# Patient Record
Sex: FEMALE | Race: WHITE | NOT HISPANIC OR LATINO | Employment: FULL TIME | URBAN - NONMETROPOLITAN AREA
[De-identification: names, ages, dates, MRNs, and addresses within clinical notes are randomized per-mention and may not be internally consistent; named-entity substitution may affect disease eponyms.]

---

## 2017-05-03 ENCOUNTER — OFFICE VISIT (OUTPATIENT)
Dept: CARDIOLOGY | Facility: CLINIC | Age: 57
End: 2017-05-03
Payer: COMMERCIAL

## 2017-05-03 VITALS
DIASTOLIC BLOOD PRESSURE: 70 MMHG | HEIGHT: 64 IN | HEART RATE: 70 BPM | SYSTOLIC BLOOD PRESSURE: 114 MMHG | WEIGHT: 169 LBS | BODY MASS INDEX: 28.85 KG/M2

## 2017-05-03 DIAGNOSIS — E78.2 MIXED HYPERLIPIDEMIA: ICD-10-CM

## 2017-05-03 DIAGNOSIS — I48.0 PAF (PAROXYSMAL ATRIAL FIBRILLATION) (HCC): ICD-10-CM

## 2017-05-03 PROCEDURE — 99213 OFFICE O/P EST LOW 20 MIN: CPT | Performed by: INTERNAL MEDICINE

## 2017-05-03 ASSESSMENT — ENCOUNTER SYMPTOMS: CARDIOVASCULAR NEGATIVE: 1

## 2017-05-03 NOTE — Clinical Note
Name:          Molly Jesus   YOB: 1960  Date:     5/3/2017      Asia Aquino, P.A.  [address]  [facility] [address]     Aroldo Garza MD  [address], [address]  [place] [address]  Phone: [phone]  Back Line: [phone]  Fax: [phone]  E-mail: [email or web address]   Dear Dr. Aquino,    We had the pleasure of seeing your patient, Molly Jesus, in Cardiology Clinic at [facility] and Vascular today.    As you know, she is a 56-year-old woman with a history of paroxysmal atrial fibrillation/flutter, hypothyroidism, GERD, and dyslipidemia.    She has no cardiovascular complaints today, and has not required any pill in pocket for a Pap and none since her last visit a year ago. I will plan to repeat her treadmill probably in one to 2 years from now. I discussed briefly her lipids with her, and I commended lifestyle modification over the nutritional supplement that she is taking.    We will have the patient follow-up in 1 year.    Thank you for the referral and please do not hesitate to contact me at any time. My contact information is listed above.    This note was dictated using Dragon speech recognition software.     A full note including my physical examination and a full list of rectified medications is available in our medical record, and can be faxed as well.    Aroldo Garza MD  Cardiologist  [facility]

## 2017-05-04 NOTE — PROGRESS NOTES
Subjective:   Molly Jesus is a 56 -year-old woman with a history of paroxysmal atrial fibrillation/flutter, hypothyroidism, GERD, and dyslipidemia.     She is doing well since her last visit. She has had no sustained episodes of palpitations consistent with atrial fibrillation. She has had some skipped beats consistent with PVCs. She has no other cardiovascular complaints today.    She discussed with me a vitamin supplement that she is taking she thinks for her cholesterol called Reliv.    Past Medical History   Diagnosis Date   • Ulcerative colitis    • Carpal tunnel syndrome    • Osteopenia    • Bipolar 1 disorder (CMS-HCC)    • Hypothyroid    • GERD (gastroesophageal reflux disease)    • Hiatal hernia    • HLD (hyperlipidemia) 6/17/2015   • HLD (hyperlipidemia) 6/17/2015     Past Surgical History   Procedure Laterality Date   • Hysterectomy, total abdominal     • Knee arthroscopy       No family history on file.  History   Smoking status   • Former Smoker   Smokeless tobacco   • Never Used     Allergies   Allergen Reactions   • Penicillins Hives   • Levaquin [Levofloxacin] Myalgia     Knee pain   • Morphine Vomiting     Severe vomiting     Outpatient Encounter Prescriptions as of 5/3/2017   Medication Sig Dispense Refill   • omeprazole (PRILOSEC) 40 MG delayed-release capsule Take 40 mg by mouth every day.     • mesalamine (ROWASA) 4 GM Enema Insert 1,000 mg in rectum every evening.     • propafenone (RYTHMOL) 300 MG tablet Take 1 Tab by mouth 1 time daily as needed (prolonged palpitations from atrial fibrillation). 5 Tab 6   • VITAMIN E PO Take  by mouth.     • montelukast (SINGULAIR) 10 MG Tab Take 10 mg by mouth every day.     • niacin  MG CPCR Take 1,000 mg by mouth every 12 hours.     • metoprolol SR (TOPROL XL) 50 MG TB24 Take 50 mg by mouth every day.     • aspirin (ASA) 81 MG CHEW chewable tablet Take 81 mg by mouth every day.     • levothyroxine (SYNTHROID) 25 MCG TABS Take 25 mcg by mouth  Every morning on an empty stomach.     • mesalamine (LIALDA) 1.2 GM TBEC Take 1.2 g by mouth every morning with breakfast.     • therapeutic multivitamin-minerals (THERAGRAN-M) TABS Take 1 Tab by mouth every day.     • Ascorbic Acid (VITAMIN C) 1000 MG TABS Take  by mouth.     • estradiol (ESTRACE) 0.1 MG/GM vaginal cream Insert  in vagina every 48 hours as needed.       No facility-administered encounter medications on file as of 5/3/2017.     Review of Systems   Cardiovascular: Negative.         Short runs of palpitations consistent with PVCs, no prolonged episodes consistent with atrial fibrillation   All other systems reviewed and are negative.       Objective:   There were no vitals taken for this visit.    Physical Exam   Constitutional: She is oriented to person, place, and time. She appears well-developed and well-nourished. No distress.   Anxious, middle-aged woman in NAD   HENT:   Head: Normocephalic and atraumatic.   Eyes: Conjunctivae and EOM are normal. Pupils are equal, round, and reactive to light. No scleral icterus.   Neck: Neck supple. No JVD present. No tracheal deviation present.   Cardiovascular: Normal rate, regular rhythm, normal heart sounds and intact distal pulses.  Exam reveals no gallop and no friction rub.    No murmur heard.  Pulses:       Dorsalis pedis pulses are 2+ on the right side, and 2+ on the left side.   No carotid bruits   Pulmonary/Chest: Effort normal and breath sounds normal. No stridor. No respiratory distress. She has no wheezes. She has no rales.   Abdominal: Soft. Bowel sounds are normal. She exhibits no distension.   Musculoskeletal: She exhibits no edema.   Neurological: She is alert and oriented to person, place, and time.   Skin: Skin is warm and dry. No rash noted. She is not diaphoretic. No erythema. No pallor.   Psychiatric: She has a normal mood and affect. Judgment and thought content normal.   Vitals reviewed.      Assessment:     1. PAF (paroxysmal atrial  fibrillation) (CMS-HCC)     2. Mixed hyperlipidemia         Medical Decision Making:  Today's Assessment / Status / Plan:     Medical Decision Making:    She has no cardiovascular complaints today, and has not required any pill in pocket for a Pap and none since her last visit a year ago. I will plan to repeat her treadmill probably in one to 2 years from now. I discussed briefly her lipids with her, and I commended lifestyle modification over the nutritional supplement that she is taking.    Aroldo Garza MD  Cardiologist, [facility] [place]

## 2017-05-04 NOTE — PROGRESS NOTES
Name:          Molly Jesus   YOB: 1960  Date:     5/3/2017      Asia Aquino, P.A.  [address]  [facility] [address]     Aroldo Garza MD  [address], [address]  [place] [address]  Phone: [phone]  Back Line: [phone]  Fax: [phone]  E-mail: [email or web address]   Dear Dr. Aquino,    We had the pleasure of seeing your patient, Molly Jesus, in Cardiology Clinic at [facility] and Vascular today.    As you know, she is a 56-year-old woman with a history of paroxysmal atrial fibrillation/flutter, hypothyroidism, GERD, and dyslipidemia.    She has no cardiovascular complaints today, and has not required any pill in pocket for a Pap and none since her last visit a year ago. I will plan to repeat her treadmill probably in one to 2 years from now. I discussed briefly her lipids with her, and I commended lifestyle modification over the nutritional supplement that she is taking.    We will have the patient follow-up in 1 year.    Thank you for the referral and please do not hesitate to contact me at any time. My contact information is listed above.    This note was dictated using Dragon speech recognition software.     A full note including my physical examination and a full list of rectified medications is available in our medical record, and can be faxed as well.    Aroldo Garza MD  Cardiologist  [facility]

## 2018-04-06 ENCOUNTER — OFFICE VISIT (OUTPATIENT)
Dept: URGENT CARE | Facility: PHYSICIAN GROUP | Age: 58
End: 2018-04-06
Payer: COMMERCIAL

## 2018-04-06 VITALS
BODY MASS INDEX: 29.02 KG/M2 | WEIGHT: 170 LBS | OXYGEN SATURATION: 96 % | HEIGHT: 64 IN | TEMPERATURE: 98.9 F | HEART RATE: 82 BPM | DIASTOLIC BLOOD PRESSURE: 74 MMHG | SYSTOLIC BLOOD PRESSURE: 122 MMHG | RESPIRATION RATE: 14 BRPM

## 2018-04-06 DIAGNOSIS — Z02.4 ENCOUNTER FOR CDL (COMMERCIAL DRIVING LICENSE) EXAM: ICD-10-CM

## 2018-04-06 PROCEDURE — 7100 PR DOT PHYSICAL: Performed by: PHYSICIAN ASSISTANT

## 2018-04-06 ASSESSMENT — PAIN SCALES - GENERAL: PAINLEVEL: NO PAIN

## 2018-04-06 NOTE — PROGRESS NOTES
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 She has a history of atrial fibrillation, she is not on anticoagulant.   Please see the chart for further information, however normal physical exam otherwise, cleared for 2yr cert without restrictions.

## 2018-04-13 ENCOUNTER — OFFICE VISIT (OUTPATIENT)
Dept: CARDIOLOGY | Facility: MEDICAL CENTER | Age: 58
End: 2018-04-13
Payer: COMMERCIAL

## 2018-04-13 VITALS
SYSTOLIC BLOOD PRESSURE: 92 MMHG | OXYGEN SATURATION: 97 % | HEIGHT: 64 IN | HEART RATE: 80 BPM | DIASTOLIC BLOOD PRESSURE: 60 MMHG | WEIGHT: 168 LBS | BODY MASS INDEX: 28.68 KG/M2

## 2018-04-13 DIAGNOSIS — E78.5 DYSLIPIDEMIA: ICD-10-CM

## 2018-04-13 DIAGNOSIS — I48.0 PAF (PAROXYSMAL ATRIAL FIBRILLATION) (HCC): ICD-10-CM

## 2018-04-13 PROCEDURE — 99213 OFFICE O/P EST LOW 20 MIN: CPT | Performed by: INTERNAL MEDICINE

## 2018-04-13 RX ORDER — PROPAFENONE HYDROCHLORIDE 300 MG/1
300 TABLET, COATED ORAL
Qty: 5 TAB | Refills: 6 | Status: SHIPPED | OUTPATIENT
Start: 2018-04-13 | End: 2019-04-19 | Stop reason: SDUPTHER

## 2018-04-13 ASSESSMENT — ENCOUNTER SYMPTOMS: PALPITATIONS: 1

## 2018-04-13 NOTE — PROGRESS NOTES
Subjective:   Molly Jesus is a 57 -year-old woman with a history of paroxysmal atrial fibrillation/flutter, hypothyroidism, GERD, and dyslipidemia.     She is doing well today, and has no cardiovascular complaints. She did recently stop niacin she believes though my last note documents that she had previously been on an alternative supplement called Reliv.    She does tell me but infrequent episodes of skipped beats consistent with PVCs, and one episode of sustained atrial fibrillation. She had forgotten her Rythmol, and her  had gone home to get the medication. The episode aborted spontaneously prior to taking the medicine, and as such she has not taken any doses of medicine since her last visit.    She comes in with her sister today, but the very pleasant.    Past Medical History:   Diagnosis Date   • Bipolar 1 disorder (CMS-[facility])    • Carpal tunnel syndrome    • GERD (gastroesophageal reflux disease)    • Hiatal hernia    • HLD (hyperlipidemia) 6/17/2015   • HLD (hyperlipidemia) 6/17/2015   • Hypothyroid    • Osteopenia    • Ulcerative colitis (CMS-[facility])      Past Surgical History:   Procedure Laterality Date   • HYSTERECTOMY, TOTAL ABDOMINAL     • KNEE ARTHROSCOPY       Family History   Problem Relation Age of Onset   • Heart Disease Sister      Mitral valve prolapse     History   Smoking Status   • Former Smoker   Smokeless Tobacco   • Never Used     Allergies   Allergen Reactions   • Penicillins Hives   • Levaquin [Levofloxacin] Myalgia     Knee pain   • Morphine Vomiting     Severe vomiting     Outpatient Encounter Prescriptions as of 4/13/2018   Medication Sig Dispense Refill   • Chlorpheniramine Maleate (ALLERGY PO) Take  by mouth.     • propafenone (RYTHMOL) 300 MG tablet Take 1 Tab by mouth 1 time daily as needed (prolonged palpitations from atrial fibrillation). 5 Tab 6   • omeprazole (PRILOSEC) 40 MG delayed-release capsule Take 40 mg by mouth every day.     • montelukast (SINGULAIR) 10 MG Tab  "Take 10 mg by mouth every day.     • metoprolol SR (TOPROL XL) 50 MG TB24 Take 50 mg by mouth every day.     • aspirin (ASA) 81 MG CHEW chewable tablet Take 81 mg by mouth every day.     • levothyroxine (SYNTHROID) 25 MCG TABS Take 25 mcg by mouth Every morning on an empty stomach.     • mesalamine (LIALDA) 1.2 GM TBEC Take 1.2 g by mouth every morning with breakfast.     • therapeutic multivitamin-minerals (THERAGRAN-M) TABS Take 1 Tab by mouth every day.     • Ascorbic Acid (VITAMIN C) 1000 MG TABS Take  by mouth.     • estradiol (ESTRACE) 0.1 MG/GM vaginal cream Insert  in vagina every 48 hours as needed.     • [DISCONTINUED] mesalamine (ROWASA) 4 GM Enema Insert 1,000 mg in rectum every evening.     • [DISCONTINUED] propafenone (RYTHMOL) 300 MG tablet Take 1 Tab by mouth 1 time daily as needed (prolonged palpitations from atrial fibrillation). 5 Tab 6   • [DISCONTINUED] VITAMIN E PO Take  by mouth.     • [DISCONTINUED] niacin  MG CPCR Take 1,000 mg by mouth every 12 hours.       No facility-administered encounter medications on file as of 4/13/2018.      Review of Systems   Cardiovascular: Positive for palpitations (infrequent palpitations consistent with PVCs, one episode of atrial fibrillation since her last visit).        Short runs of palpitations consistent with PVCs, no prolonged episodes consistent with atrial fibrillation   All other systems reviewed and are negative.       Objective:   BP (!) 92/60   Pulse 80   Ht 1.613 m (5' 3.5\")   Wt 76.2 kg (168 lb)   SpO2 97%   BMI 29.29 kg/m²     Physical Exam   Constitutional: She is oriented to person, place, and time. She appears well-developed and well-nourished. No distress.   Very pleasant, middle-age woman in no distress. She comes in with her sister today.   HENT:   Head: Normocephalic and atraumatic.   Eyes: Conjunctivae and EOM are normal. Pupils are equal, round, and reactive to light. No scleral icterus.   Neck: Neck supple. No JVD present. " No tracheal deviation present.   Cardiovascular: Normal rate, regular rhythm, normal heart sounds and intact distal pulses.  Exam reveals no gallop and no friction rub.    No murmur heard.  Pulses:       Dorsalis pedis pulses are 2+ on the right side, and 2+ on the left side.   No carotid bruits   Pulmonary/Chest: Effort normal and breath sounds normal. No stridor. No respiratory distress. She has no wheezes. She has no rales.   Abdominal: Soft. Bowel sounds are normal. She exhibits no distension.   Musculoskeletal: She exhibits edema (trace-1+ bilateral lower extremity edema very slightly greater on the left than right).   Neurological: She is alert and oriented to person, place, and time.   Skin: Skin is warm and dry. No rash noted. She is not diaphoretic. No erythema. No pallor.   Psychiatric: She has a normal mood and affect. Judgment and thought content normal.   Vitals reviewed.    Exercise treadmill test, 1/15/2016 (tracings reviewed): Patient did 5 minutes 59 seconds on the Kameron protocol achieving 89% of age-predicted maximum heart rate with no ischemic ECG changes    Labs, 12/15/2017  Chemistry panel: Sodium 143, potassium 4.3,   chloride 107, CO2 30, BUN 11, creatinine 0.8, glucose 102, calcium 8.7  LFTs: AST 22, ALT 39, alkaline phosphatase 116, albumin 3.7, total protein 7.0, total bilirubin 0.2  Lipids: , HDL 82, triglycerides 83, total cholesterol 216      Assessment:     1. PAF (paroxysmal atrial fibrillation) (CMS-[facility])  Echocardiogram Comp w/o Cont    propafenone (RYTHMOL) 300 MG tablet   2. Dyslipidemia  CT-CARDIAC SCORING       Medical Decision Making:  Today's Assessment / Status / Plan:     She is doing well today from a cardiovascular perspective with one episode of atrial fibrillation that aborted prior to taking her pill in the pocket Rythmol, which I refilled and she she does needed. I did order a repeat echocardiogram. I also reviewed with her her recent lipid panel, and ordered a CT  coronary calcium scan to evaluate whether we may need to do more to treat her dyslipidemia for example with a statin. I also reviewed her stroke risk of 1% given female gender and no other risk factors. I recommended initiation of an oral anticoagulant in the next couple of years despite her known gastrointestinal ulcers. I would probably start 15 mg by mouth daily of Xarelto (rivaroxaban) at that time. Otherwise, I have not changed her medical regimen at this time.    Aroldo Garza MD  Cardiologist, [facility] and [facility]     Return in about 1 year (around 4/13/2019).      Physical Exam   Constitutional: She is oriented to person, place, and time. She appears well-developed and well-nourished. No distress.   Very pleasant, middle-age woman in no distress. She comes in with her sister today.   HENT:   Head: Normocephalic and atraumatic.   Eyes: Conjunctivae and EOM are normal. Pupils are equal, round, and reactive to light. No scleral icterus.   Neck: Neck supple. No JVD present. No tracheal deviation present.   Cardiovascular: Normal rate, regular rhythm, normal heart sounds and intact distal pulses.  Exam reveals no gallop and no friction rub.    No murmur heard.  Pulses:       Dorsalis pedis pulses are 2+ on the right side, and 2+ on the left side.   No carotid bruits   Pulmonary/Chest: Effort normal and breath sounds normal. No stridor. No respiratory distress. She has no wheezes. She has no rales.   Abdominal: Soft. Bowel sounds are normal. She exhibits no distension.   Musculoskeletal: She exhibits edema (trace-1+ bilateral lower extremity edema very slightly greater on the left than right).   Neurological: She is alert and oriented to person, place, and time.   Skin: Skin is warm and dry. No rash noted. She is not diaphoretic. No erythema. No pallor.   Psychiatric: She has a normal mood and affect. Judgment and thought content normal.   Vitals reviewed.

## 2018-04-13 NOTE — LETTER
Name:          Molly Jesus   YOB: 1960  Date:     04/13/2018      Asia Aquino, P.A.  [address] [facility] [address]     Aroldo Garza MD  [address] [place] [address]  Phone: [phone]  Back Line: [phone]  Fax: [phone]  E-mail: [email or web address]   Dear Dr. Aquino,    We had the pleasure of seeing your patient, Molly Jesus, in Cardiology Clinic at [facility] and Vascular today.    As you know, she is a 57-year-old woman with a history of paroxysmal atrial fibrillation/flutter, GI ulcers, hypothyroidism, GERD, and dyslipidemia.    She is doing well today from a cardiovascular perspective with one episode of atrial fibrillation that aborted prior to taking her pill in the pocket Rythmol, which I refilled and she she does needed. I did order a repeat echocardiogram. I also reviewed with her her recent lipid panel, and ordered a CT coronary calcium scan to evaluate whether we may need to do more to treat her dyslipidemia for example with a statin. I also reviewed her stroke risk of 1% given female gender and no other risk factors. I recommended initiation of an oral anticoagulant in the next couple of years despite her known gastrointestinal ulcers. I would probably start 15 mg by mouth daily of Xarelto (rivaroxaban) at that time. Otherwise, I have not changed her medical regimen at this time.    Return in about 1 year (around 4/13/2019).    Thank you for the referral and please do not hesitate to contact me at any time. My contact information is listed above.    This note was dictated using Dragon speech recognition software.     A full note including my physical examination and a full list of rectified medications is available in our medical record, and can be faxed as well.    Aroldo Garza MD  Cardiologist  [facility]

## 2018-06-01 ENCOUNTER — HOSPITAL ENCOUNTER (OUTPATIENT)
Dept: RADIOLOGY | Facility: MEDICAL CENTER | Age: 58
End: 2018-06-01
Attending: INTERNAL MEDICINE
Payer: COMMERCIAL

## 2018-06-01 DIAGNOSIS — E78.5 DYSLIPIDEMIA: ICD-10-CM

## 2018-06-01 PROCEDURE — 4410556 CT-CARDIAC SCORING

## 2018-06-20 ENCOUNTER — TELEPHONE (OUTPATIENT)
Dept: CARDIOLOGY | Facility: MEDICAL CENTER | Age: 58
End: 2018-06-20

## 2018-06-20 NOTE — TELEPHONE ENCOUNTER
SANDHYA Sparrow/Elvia       Patient is returning your call from today. She can be reached at [phone].      ================================================================    Called pt back, no answer, detailed vm left regarding her CT cardiac scoring result

## 2018-06-20 NOTE — TELEPHONE ENCOUNTER
----- Message from Aroldo Garza M.D. sent at 6/20/2018  9:34 AM PDT -----  No cholesterol/calcium buildup in the heart arteries whatsoever.  Continue diet and exercise for cholesterol treatment.    IBA MD    ============================================================    Attempted to call pt, no answer, left vm to call back

## 2019-01-14 ENCOUNTER — APPOINTMENT (RX ONLY)
Dept: URBAN - METROPOLITAN AREA CLINIC 35 | Facility: CLINIC | Age: 59
Setting detail: DERMATOLOGY
End: 2019-01-14

## 2019-01-14 DIAGNOSIS — L82.1 OTHER SEBORRHEIC KERATOSIS: ICD-10-CM

## 2019-01-14 DIAGNOSIS — L91.8 OTHER HYPERTROPHIC DISORDERS OF THE SKIN: ICD-10-CM

## 2019-01-14 DIAGNOSIS — D22 MELANOCYTIC NEVI: ICD-10-CM

## 2019-01-14 DIAGNOSIS — Z85.828 PERSONAL HISTORY OF OTHER MALIGNANT NEOPLASM OF SKIN: ICD-10-CM

## 2019-01-14 DIAGNOSIS — Z71.89 OTHER SPECIFIED COUNSELING: ICD-10-CM

## 2019-01-14 DIAGNOSIS — L81.4 OTHER MELANIN HYPERPIGMENTATION: ICD-10-CM

## 2019-01-14 PROBLEM — J30.1 ALLERGIC RHINITIS DUE TO POLLEN: Status: ACTIVE | Noted: 2019-01-14

## 2019-01-14 PROBLEM — D22.5 MELANOCYTIC NEVI OF TRUNK: Status: ACTIVE | Noted: 2019-01-14

## 2019-01-14 PROCEDURE — ? OBSERVATION AND MEASURE

## 2019-01-14 PROCEDURE — ? COUNSELING

## 2019-01-14 PROCEDURE — 99213 OFFICE O/P EST LOW 20 MIN: CPT

## 2019-01-14 ASSESSMENT — LOCATION DETAILED DESCRIPTION DERM
LOCATION DETAILED: RIGHT LATERAL SUPERIOR CHEST
LOCATION DETAILED: RIGHT PROXIMAL DORSAL FOREARM
LOCATION DETAILED: EPIGASTRIC SKIN
LOCATION DETAILED: RIGHT RIB CAGE
LOCATION DETAILED: MIDDLE STERNUM
LOCATION DETAILED: PERIUMBILICAL SKIN
LOCATION DETAILED: RIGHT LATERAL ABDOMEN
LOCATION DETAILED: RIGHT INFERIOR MEDIAL UPPER BACK
LOCATION DETAILED: RIGHT CLAVICULAR NECK
LOCATION DETAILED: RIGHT INFERIOR LATERAL NECK
LOCATION DETAILED: RIGHT INFERIOR ANTERIOR NECK
LOCATION DETAILED: LEFT MEDIAL MALAR CHEEK
LOCATION DETAILED: RIGHT SUPRAPUBIC SKIN
LOCATION DETAILED: LEFT INFERIOR LATERAL NECK

## 2019-01-14 ASSESSMENT — LOCATION SIMPLE DESCRIPTION DERM
LOCATION SIMPLE: CHEST
LOCATION SIMPLE: RIGHT UPPER BACK
LOCATION SIMPLE: GROIN
LOCATION SIMPLE: LEFT CHEEK
LOCATION SIMPLE: RIGHT ANTERIOR NECK
LOCATION SIMPLE: RIGHT FOREARM
LOCATION SIMPLE: ABDOMEN
LOCATION SIMPLE: LEFT ANTERIOR NECK

## 2019-01-14 ASSESSMENT — LOCATION ZONE DERM
LOCATION ZONE: FACE
LOCATION ZONE: TRUNK
LOCATION ZONE: ARM
LOCATION ZONE: NECK

## 2019-04-19 ENCOUNTER — OFFICE VISIT (OUTPATIENT)
Dept: CARDIOLOGY | Facility: MEDICAL CENTER | Age: 59
End: 2019-04-19
Payer: COMMERCIAL

## 2019-04-19 VITALS
BODY MASS INDEX: 28.17 KG/M2 | HEART RATE: 76 BPM | OXYGEN SATURATION: 98 % | DIASTOLIC BLOOD PRESSURE: 70 MMHG | HEIGHT: 64 IN | WEIGHT: 165 LBS | SYSTOLIC BLOOD PRESSURE: 104 MMHG

## 2019-04-19 DIAGNOSIS — E78.2 MIXED HYPERLIPIDEMIA: ICD-10-CM

## 2019-04-19 DIAGNOSIS — R93.1 AGATSTON CAC SCORE, <100: ICD-10-CM

## 2019-04-19 DIAGNOSIS — I48.0 PAF (PAROXYSMAL ATRIAL FIBRILLATION) (HCC): Primary | ICD-10-CM

## 2019-04-19 PROCEDURE — 99214 OFFICE O/P EST MOD 30 MIN: CPT | Performed by: INTERNAL MEDICINE

## 2019-04-19 RX ORDER — PROPAFENONE HYDROCHLORIDE 300 MG/1
300 TABLET, COATED ORAL
Qty: 5 TAB | Refills: 6 | Status: SHIPPED | OUTPATIENT
Start: 2019-04-19 | End: 2021-12-22

## 2019-04-19 RX ORDER — PAROXETINE HYDROCHLORIDE 20 MG/1
20 TABLET, FILM COATED ORAL DAILY
COMMUNITY
Start: 2019-04-13

## 2019-04-19 ASSESSMENT — ENCOUNTER SYMPTOMS: PALPITATIONS: 1

## 2019-04-19 NOTE — PROGRESS NOTES
Subjective:   Molly Jesus is a 58 -year-old woman with a history of paroxysmal atrial fibrillation/flutter, GI ulcers, hypothyroidism, GERD, and dyslipidemia.     He is doing quite well today, and has no cardiovascular complaints.  She tells me about one episode of palpitations consistent with atrial fibrillation in October or November 2018.  She was at work, and asked her  to bring her the prescription for Rythmol.  The whole episode lasted for about 30 minutes, and resolved spontaneously before she took that medication.    She has no other cardiovascular complaints.    She continues on aspirin without any side effects.  We reviewed the results of her coronary calcium scan as below.    Past Medical History:   Diagnosis Date   • Bipolar 1 disorder (HCC)    • Carpal tunnel syndrome    • GERD (gastroesophageal reflux disease)    • Hiatal hernia    • HLD (hyperlipidemia) 6/17/2015   • HLD (hyperlipidemia) 6/17/2015   • Hypothyroid    • Osteopenia    • Ulcerative colitis (HCC)      Past Surgical History:   Procedure Laterality Date   • HYSTERECTOMY, TOTAL ABDOMINAL     • KNEE ARTHROSCOPY       Family History   Problem Relation Age of Onset   • Heart Disease Sister         Mitral valve prolapse     History   Smoking Status   • Former Smoker   Smokeless Tobacco   • Never Used     Allergies   Allergen Reactions   • Penicillins Hives   • Levaquin [Levofloxacin] Myalgia     Knee pain   • Morphine Vomiting     Severe vomiting     Outpatient Encounter Prescriptions as of 4/19/2019   Medication Sig Dispense Refill   • PARoxetine (PAXIL) 20 MG Tab      • propafenone (RYTHMOL) 300 MG tablet Take 1 Tab by mouth 1 time daily as needed (prolonged palpitations from atrial fibrillation). 5 Tab 6   • Chlorpheniramine Maleate (ALLERGY PO) Take  by mouth.     • omeprazole (PRILOSEC) 40 MG delayed-release capsule Take 40 mg by mouth every day.     • montelukast (SINGULAIR) 10 MG Tab Take 10 mg by mouth every day.     •  "metoprolol SR (TOPROL XL) 50 MG TB24 Take 50 mg by mouth every day.     • aspirin (ASA) 81 MG CHEW chewable tablet Take 81 mg by mouth every day.     • levothyroxine (SYNTHROID) 25 MCG TABS Take 25 mcg by mouth Every morning on an empty stomach.     • mesalamine (LIALDA) 1.2 GM TBEC Take 1.2 g by mouth every morning with breakfast.     • therapeutic multivitamin-minerals (THERAGRAN-M) TABS Take 1 Tab by mouth every day.     • estradiol (ESTRACE) 0.1 MG/GM vaginal cream Insert  in vagina every 48 hours as needed.     • [DISCONTINUED] propafenone (RYTHMOL) 300 MG tablet Take 1 Tab by mouth 1 time daily as needed (prolonged palpitations from atrial fibrillation). (Patient not taking: Reported on 4/19/2019) 5 Tab 6   • [DISCONTINUED] Ascorbic Acid (VITAMIN C) 1000 MG TABS Take  by mouth.       No facility-administered encounter medications on file as of 4/19/2019.      Review of Systems   Cardiovascular: Positive for palpitations (One significant episode of palpitations consistent with atrial fibrillation in the last year).        Short runs of palpitations consistent with PVCs, no prolonged episodes consistent with atrial fibrillation   All other systems reviewed and are negative.       Objective:   /70 (BP Location: Left arm, Patient Position: Sitting, BP Cuff Size: Adult)   Pulse 76   Ht 1.613 m (5' 3.5\")   Wt 74.8 kg (165 lb)   SpO2 98%   BMI 28.77 kg/m²     Physical Exam   Constitutional: She is oriented to person, place, and time. She appears well-developed and well-nourished. No distress.   Very pleasant, middle-age woman in no distress. She comes in with her  today.  Physical examination is unchanged except where specified compared to my previous on 4/13/2018.   HENT:   Head: Normocephalic and atraumatic.   Eyes: Pupils are equal, round, and reactive to light. Conjunctivae and EOM are normal. No scleral icterus.   Neck: Neck supple. No JVD present. No tracheal deviation present. " "  Cardiovascular: Normal rate, regular rhythm, normal heart sounds and intact distal pulses.  Exam reveals no gallop and no friction rub.    No murmur heard.  Pulses:       Dorsalis pedis pulses are 2+ on the right side, and 2+ on the left side.   No carotid bruits   Pulmonary/Chest: Effort normal and breath sounds normal. No stridor. No respiratory distress. She has no wheezes. She has no rales.   Abdominal: Soft. Bowel sounds are normal. She exhibits no distension.   Musculoskeletal: She exhibits no edema (Trace bilateral lower extremity edema).   Neurological: She is alert and oriented to person, place, and time.   Skin: Skin is warm and dry. No rash noted. She is not diaphoretic. No erythema. No pallor.   Psychiatric: She has a normal mood and affect. Judgment and thought content normal.   Vitals reviewed.    Exercise treadmill test, 1/15/2016 (tracings reviewed): Patient did 5 minutes 59 seconds on the Kameron protocol achieving 89% of age-predicted maximum heart rate with no ischemic ECG changes    Labs, 12/15/2017  Chemistry panel: Sodium 143, potassium 4.3,   chloride 107, CO2 30, BUN 11, creatinine 0.8, glucose 102, calcium 8.7  LFTs: AST 22, ALT 39, alkaline phosphatase 116, albumin 3.7, total protein 7.0, total bilirubin 0.2  Lipids: , HDL 82, triglycerides 83, total cholesterol 216    Coronary calcium scan, 6/1/2018: Total score of 0.0    Echocardiogram, 8/5/2015:  \"CONCLUSIONS  Normal left ventricular size, thickness, systolic function, and   diastolic function.  Mild mitral regurgitation.  Trace aortic insufficiency.  Mild tricuspid regurgitation.  Trace pulmonic insufficiency\"      Assessment:     1. PAF (paroxysmal atrial fibrillation) ([facility])  EC-ECHOCARDIOGRAM COMPLETE W/O CONT    propafenone (RYTHMOL) 300 MG tablet   2. Mixed hyperlipidemia     3. Agatston CAC score 0.0 on 6/2018         Medical Decision Making:  Today's Assessment / Status / Plan:     She is doing quite well today, and has had " one episode of atrial fibrillation in the last year that resolved spontaneously before she could take her antiarrhythmic, Rythmol.  She continues on aspirin, and has no other cardiovascular complaints.  I reviewed with her the results of her echocardiogram from 2015 and reminded her that I did ask her to get an echocardiogram last year reordering that again.  She has absolutely no coronary calcification (total score of 0.0) on her calcium score again last summer.  I refilled her prescription for Rythmol, and made no changes to her medical regimen otherwise.  I did emphasize with her that at the age of 64 she will have a 2% annual risk of stroke and would graduate at that point to an oral anticoagulant.    Aroldo Garza MD  Cardiologist, [facility] [place]     Return in about 1 year (around 4/19/2020).      Physical Exam   Constitutional: She is oriented to person, place, and time. She appears well-developed and well-nourished. No distress.   Very pleasant, middle-age woman in no distress. She comes in with her  today.  Physical examination is unchanged except where specified compared to my previous on 4/13/2018.   HENT:   Head: Normocephalic and atraumatic.   Eyes: Pupils are equal, round, and reactive to light. Conjunctivae and EOM are normal. No scleral icterus.   Neck: Neck supple. No JVD present. No tracheal deviation present.   Cardiovascular: Normal rate, regular rhythm, normal heart sounds and intact distal pulses.  Exam reveals no gallop and no friction rub.    No murmur heard.  Pulses:       Dorsalis pedis pulses are 2+ on the right side, and 2+ on the left side.   No carotid bruits   Pulmonary/Chest: Effort normal and breath sounds normal. No stridor. No respiratory distress. She has no wheezes. She has no rales.   Abdominal: Soft. Bowel sounds are normal. She exhibits no distension.   Musculoskeletal: She exhibits no edema (Trace bilateral lower extremity edema).   Neurological:  She is alert and oriented to person, place, and time.   Skin: Skin is warm and dry. No rash noted. She is not diaphoretic. No erythema. No pallor.   Psychiatric: She has a normal mood and affect. Judgment and thought content normal.   Vitals reviewed.

## 2019-04-19 NOTE — LETTER
Name:          Molly Jesus   YOB: 1960  Date:     04/19/2019      Asia Aquino, P.A.  [address] [facility] [address]     Aroldo Garza MD  [address] [place] [id number]  Phone: [phone]  Back Line: [phone]  Fax: [phone]  E-mail: [email or web address]   Dear Dr. Aquino,    We had the pleasure of seeing your patient, Molly Jesus, in Cardiology Clinic at [facility] and [facility] today.    As you know, she is a 58-year-old woman with a history of paroxysmal atrial fibrillation/flutter, GI ulcers, hypothyroidism, GERD, and dyslipidemia.    She is doing quite well today, and has had one episode of atrial fibrillation in the last year that resolved spontaneously before she could take her antiarrhythmic, Rythmol.  She continues on aspirin, and has no other cardiovascular complaints.  I reviewed with her the results of her echocardiogram from 2015 and reminded her that I did ask her to get an echocardiogram last year reordering that again.  She has absolutely no coronary calcification (total score of 0.0) on her calcium score again last summer.  I refilled her prescription for Rythmol, and made no changes to her medical regimen otherwise.  I did emphasize with her that at the age of 64 she will have a 2% annual risk of stroke and would graduate at that point to an oral anticoagulant.    Return in about 1 year (around 4/19/2020).    Thank you for the referral and please do not hesitate to contact me at any time. My contact information is listed above.    This note was dictated using Dragon speech recognition software.     A full note including my physical examination and a full list of rectified medications is available in our medical record, and can be faxed as well.    Aroldo Garza MD  Cardiologist  [facility]

## 2019-07-16 ENCOUNTER — HOSPITAL ENCOUNTER (OUTPATIENT)
Dept: CARDIOLOGY | Facility: MEDICAL CENTER | Age: 59
End: 2019-07-16
Attending: INTERNAL MEDICINE
Payer: COMMERCIAL

## 2019-07-16 DIAGNOSIS — I48.0 PAF (PAROXYSMAL ATRIAL FIBRILLATION) (HCC): ICD-10-CM

## 2019-07-16 LAB
LV EJECT FRACT  99904: 60
LV EJECT FRACT MOD 2C 99903: 52.57
LV EJECT FRACT MOD 4C 99902: 64.15
LV EJECT FRACT MOD BP 99901: 56.33

## 2019-07-16 PROCEDURE — 93306 TTE W/DOPPLER COMPLETE: CPT

## 2019-07-16 PROCEDURE — 93306 TTE W/DOPPLER COMPLETE: CPT | Mod: 26 | Performed by: INTERNAL MEDICINE

## 2019-08-12 ENCOUNTER — APPOINTMENT (RX ONLY)
Dept: URBAN - METROPOLITAN AREA CLINIC 35 | Facility: CLINIC | Age: 59
Setting detail: DERMATOLOGY
End: 2019-08-12

## 2019-08-12 DIAGNOSIS — L60.9 NAIL DISORDER, UNSPECIFIED: ICD-10-CM

## 2019-08-12 DIAGNOSIS — Z85.828 PERSONAL HISTORY OF OTHER MALIGNANT NEOPLASM OF SKIN: ICD-10-CM

## 2019-08-12 DIAGNOSIS — L73.8 OTHER SPECIFIED FOLLICULAR DISORDERS: ICD-10-CM

## 2019-08-12 DIAGNOSIS — Z71.89 OTHER SPECIFIED COUNSELING: ICD-10-CM

## 2019-08-12 DIAGNOSIS — D22 MELANOCYTIC NEVI: ICD-10-CM

## 2019-08-12 DIAGNOSIS — L81.4 OTHER MELANIN HYPERPIGMENTATION: ICD-10-CM

## 2019-08-12 DIAGNOSIS — L72.0 EPIDERMAL CYST: ICD-10-CM

## 2019-08-12 DIAGNOSIS — L82.1 OTHER SEBORRHEIC KERATOSIS: ICD-10-CM

## 2019-08-12 PROBLEM — D22.5 MELANOCYTIC NEVI OF TRUNK: Status: ACTIVE | Noted: 2019-08-12

## 2019-08-12 PROCEDURE — ? DEFER

## 2019-08-12 PROCEDURE — 99214 OFFICE O/P EST MOD 30 MIN: CPT

## 2019-08-12 PROCEDURE — ? COUNSELING

## 2019-08-12 PROCEDURE — ? OBSERVATION AND MEASURE

## 2019-08-12 ASSESSMENT — LOCATION ZONE DERM
LOCATION ZONE: FACE
LOCATION ZONE: NECK
LOCATION ZONE: FINGERNAIL
LOCATION ZONE: NOSE
LOCATION ZONE: TRUNK
LOCATION ZONE: ARM

## 2019-08-12 ASSESSMENT — LOCATION DETAILED DESCRIPTION DERM
LOCATION DETAILED: RIGHT SUPERIOR NASAL CHEEK
LOCATION DETAILED: MIDDLE STERNUM
LOCATION DETAILED: EPIGASTRIC SKIN
LOCATION DETAILED: RIGHT PROXIMAL DORSAL FOREARM
LOCATION DETAILED: RIGHT CLAVICULAR NECK
LOCATION DETAILED: NASAL DORSUM
LOCATION DETAILED: NASAL SUPRATIP
LOCATION DETAILED: RIGHT RIB CAGE
LOCATION DETAILED: RIGHT INFERIOR MEDIAL UPPER BACK
LOCATION DETAILED: RIGHT LATERAL ABDOMEN
LOCATION DETAILED: RIGHT INDEX FINGERNAIL
LOCATION DETAILED: LEFT CENTRAL BUCCAL CHEEK
LOCATION DETAILED: RIGHT LATERAL SUPERIOR CHEST
LOCATION DETAILED: RIGHT SUPRAPUBIC SKIN
LOCATION DETAILED: LEFT MEDIAL MALAR CHEEK

## 2019-08-12 ASSESSMENT — LOCATION SIMPLE DESCRIPTION DERM
LOCATION SIMPLE: ABDOMEN
LOCATION SIMPLE: RIGHT INDEX FINGERNAIL
LOCATION SIMPLE: NOSE
LOCATION SIMPLE: RIGHT FOREARM
LOCATION SIMPLE: RIGHT CHEEK
LOCATION SIMPLE: RIGHT UPPER BACK
LOCATION SIMPLE: RIGHT ANTERIOR NECK
LOCATION SIMPLE: LEFT CHEEK
LOCATION SIMPLE: GROIN
LOCATION SIMPLE: CHEST

## 2019-08-12 NOTE — PROCEDURE: DEFER
Detail Level: Detailed
Procedure To Be Performed At Next Visit: Curettage
Introduction Text (Please End With A Colon): :

## 2019-08-12 NOTE — MR AVS SNAPSHOT
"        Molly Jesus   5/3/2017 3:40 PM   Office Visit   MRN: [id number]    Department:  Heart [facility]   Dept Phone:  [phone]    Description:  Female : 1960   Provider:  Aroldo Garza M.D.           Reason for Visit     Follow-Up           Allergies as of 5/3/2017     Allergen Noted Reactions    Penicillins 2015   Hives    Levaquin [Levofloxacin] 2015   Myalgia    Knee pain    Morphine 2015   Vomiting    Severe vomiting      You were diagnosed with     PAF (paroxysmal atrial fibrillation) (CMS-HCC)   [026029]       Mixed hyperlipidemia   [272.2.ICD-9-CM]         Vital Signs     Blood Pressure Pulse Height Weight Body Mass Index Smoking Status    114/70 mmHg 70 1.613 m (5' 3.5\") 76.658 kg (169 lb) 29.46 kg/m2 Former Smoker      Basic Information     Date Of Birth Sex Race Ethnicity Preferred Language    1960 Female White Non- English      Problem List              ICD-10-CM Priority Class Noted - Resolved    Hypothyroidism E03.9   2015 - Present    HLD (hyperlipidemia) E78.5   2015 - Present    PAF (paroxysmal atrial fibrillation) (CMS-HCC) I48.0   2015 - Present      Health Maintenance        Date Due Completion Dates    IMM DTaP/Tdap/Td Vaccine (1 - Tdap) 1978 ---    PAP SMEAR 1980 ---    MAMMOGRAM 1999 ---    COLONOSCOPY 2009 ---            Current Immunizations     No immunizations on file.      Below and/or attached are the medications your provider expects you to take. Review all of your home medications and newly ordered medications with your provider and/or pharmacist. Follow medication instructions as directed by your provider and/or pharmacist. Please keep your medication list with you and share with your provider. Update the information when medications are discontinued, doses are changed, or new medications (including over-the-counter products) are added; and carry medication information at all times in the event of " Message left directing patient to call office to discuss concerns emergency situations     Allergies:  PENICILLINS - Hives     LEVAQUIN - Myalgia     MORPHINE - Vomiting               Medications  Valid as of: May 03, 2017 -  5:38 PM    Generic Name Brand Name Tablet Size Instructions for use    Ascorbic Acid (Tab) Vitamin C 1000 MG Take  by mouth.        Aspirin (Chew Tab) ASA 81 MG Take 81 mg by mouth every day.        Estradiol (Cream) ESTRACE 0.1 MG/GM Insert  in vagina every 48 hours as needed.        Levothyroxine Sodium (Tab) SYNTHROID 25 MCG Take 25 mcg by mouth Every morning on an empty stomach.        Mesalamine (Tablet Delayed Response) LIALDA 1.2 GM Take 1.2 g by mouth every morning with breakfast.        Mesalamine (Enema) ROWASA 4 GM Insert 1,000 mg in rectum every evening.        Metoprolol Succinate (TABLET SR 24 HR) TOPROL XL 50 MG Take 50 mg by mouth every day.        Montelukast Sodium (Tab) SINGULAIR 10 MG Take 10 mg by mouth every day.        Multiple Vitamins-Minerals (Tab) THERAGRAN-M  Take 1 Tab by mouth every day.        Niacin (Cap CR) niacin  MG Take 1,000 mg by mouth every 12 hours.        Omeprazole (CAPSULE DELAYED RELEASE) PRILOSEC 40 MG Take 40 mg by mouth every day.        Propafenone HCl (Tab) RYTHMOL 300 MG Take 1 Tab by mouth 1 time daily as needed (prolonged palpitations from atrial fibrillation).        Vitamin E   Take  by mouth.        .                 Medicines prescribed today were sent to:     [id number]  [facility] [address] [place] [address]    Phone: [phone] Fax: [phone]    Open 24 Hours?: No      Medication refill instructions:       If your prescription bottle indicates you have medication refills left, it is not necessary to call your provider’s office. Please contact your pharmacy and they will refill your medication.    If your prescription bottle indicates you do not have any refills left, you may request refills at any time through one of the following ways: The online [employer]  system (except Urgent Care), by calling your provider’s office, or by asking your pharmacy to contact your provider’s office with a refill request. Medication refills are processed only during regular business hours and may not be available until the next business day. Your provider may request additional information or to have a follow-up visit with you prior to refilling your medication.   *Please Note: Medication refills are assigned a new Rx number when refilled electronically. Your pharmacy may indicate that no refills were authorized even though a new prescription for the same medication is available at the pharmacy. Please request the medicine by name with the pharmacy before contacting your provider for a refill.           Energy Points Access Code: [email or web address]  Expires: 6/2/2017  5:38 PM    Energy Points  A secure, online tool to manage your health information     [employer]’s Energy Points® is a secure, online tool that connects you to your personalized health information from the privacy of your home -- day or night - making it very easy for you to manage your healthcare. Once the activation process is completed, you can even access your medical information using the Energy Points adela, which is available for free in the Apple Adela store or Google Play store.     Energy Points provides the following levels of access (as shown below):   My Chart Features   [facility] Doctor [facility]  Urgent  Care Non-[facility]  [facility] Care  Doctor   Email your healthcare team securely and privately 24/7 X X X    Manage appointments: schedule your next appointment; view details of past/upcoming appointments X      Request prescription refills. X      View recent personal medical records, including lab and immunizations X X X X   View health record, including health history, allergies, medications X X X X   Read reports about your outpatient visits, procedures, consult and ER notes X X X X   See your discharge summary,  which is a recap of your hospital and/or ER visit that includes your diagnosis, lab results, and care plan. X X       How to register for Zannel:  1. Go to  [email or web address].  2. Click on the Sign Up Now box, which takes you to the New Member Sign Up page. You will need to provide the following information:  a. Enter your Zannel Access Code exactly as it appears at the top of this page. (You will not need to use this code after you’ve completed the sign-up process. If you do not sign up before the expiration date, you must request a new code.)   b. Enter your date of birth.   c. Enter your home email address.   d. Click Submit, and follow the next screen’s instructions.  3. Create a Zannel ID. This will be your Zannel login ID and cannot be changed, so think of one that is secure and easy to remember.  4. Create a Zannel password. You can change your password at any time.  5. Enter your Password Reset Question and Answer. This can be used at a later time if you forget your password.   6. Enter your e-mail address. This allows you to receive e-mail notifications when new information is available in Zannel.  7. Click Sign Up. You can now view your health information.    For assistance activating your Zannel account, call [phone]

## 2019-09-16 ENCOUNTER — APPOINTMENT (RX ONLY)
Dept: URBAN - METROPOLITAN AREA CLINIC 35 | Facility: CLINIC | Age: 59
Setting detail: DERMATOLOGY
End: 2019-09-16

## 2019-09-16 DIAGNOSIS — D485 NEOPLASM OF UNCERTAIN BEHAVIOR OF SKIN: ICD-10-CM

## 2019-09-16 PROBLEM — D48.5 NEOPLASM OF UNCERTAIN BEHAVIOR OF SKIN: Status: ACTIVE | Noted: 2019-09-16

## 2019-09-16 PROCEDURE — 11102 TANGNTL BX SKIN SINGLE LES: CPT

## 2019-09-16 PROCEDURE — ? ADDITIONAL NOTES

## 2019-09-16 PROCEDURE — ? BIOPSY BY SHAVE METHOD

## 2019-09-16 ASSESSMENT — LOCATION ZONE DERM: LOCATION ZONE: FINGER

## 2019-09-16 ASSESSMENT — LOCATION SIMPLE DESCRIPTION DERM: LOCATION SIMPLE: LEFT INDEX FINGER

## 2019-09-16 ASSESSMENT — LOCATION DETAILED DESCRIPTION DERM: LOCATION DETAILED: LEFT DISTAL DORSAL INDEX FINGER

## 2019-09-16 NOTE — PROCEDURE: BIOPSY BY SHAVE METHOD
Bill [id number] For Specimen Handling/Conveyance To Laboratory?: no
Type Of Destruction Used: Electrodesiccation and Curettage
Size Of Lesion In Cm: 0.3
Lab Facility: 
X Size Of Lesion In Cm: 0
Hemostasis: Aluminum Chloride
Biopsy Type: H and E
Anesthesia Type: 0.5% lidocaine with 1:200,000 epinephrine and a 1:10 solution of 8.4% sodium bicarbonate
Billing Type: Third-Party Bill
Post-Care Instructions: I reviewed with the patient in detail post-care instructions. Patient is to keep the biopsy site dry overnight, and then apply white petrolatum twice daily until healed. Patient may apply hydrogen peroxide soaks to remove any crusting.
Dressing: pressure dressing
Depth Of Biopsy: dermis
Notification Instructions: Patient will be notified of biopsy results. However, patient instructed to call the office if not contacted within 2 weeks.
Curettage Text: The wound bed was treated with curettage after the biopsy was performed.
Consent: Written consent was obtained and risks were reviewed including but not limited to scarring, infection, bleeding, scabbing, incomplete removal, nerve damage and allergy to anesthesia.
Detail Level: Detailed
Render Post-Care Instructions In Note?: yes
Lab: [id number]
Anesthesia Volume In Cc: 1.1
Wound Care: Petrolatum
Biopsy Method: Dermablade

## 2020-02-18 ENCOUNTER — APPOINTMENT (RX ONLY)
Dept: URBAN - METROPOLITAN AREA CLINIC 35 | Facility: CLINIC | Age: 60
Setting detail: DERMATOLOGY
End: 2020-02-18

## 2020-02-18 DIAGNOSIS — D22 MELANOCYTIC NEVI: ICD-10-CM

## 2020-02-18 DIAGNOSIS — Z71.89 OTHER SPECIFIED COUNSELING: ICD-10-CM

## 2020-02-18 DIAGNOSIS — Z85.828 PERSONAL HISTORY OF OTHER MALIGNANT NEOPLASM OF SKIN: ICD-10-CM

## 2020-02-18 DIAGNOSIS — L82.1 OTHER SEBORRHEIC KERATOSIS: ICD-10-CM

## 2020-02-18 DIAGNOSIS — L81.4 OTHER MELANIN HYPERPIGMENTATION: ICD-10-CM

## 2020-02-18 DIAGNOSIS — L82.0 INFLAMED SEBORRHEIC KERATOSIS: ICD-10-CM

## 2020-02-18 PROBLEM — D22.5 MELANOCYTIC NEVI OF TRUNK: Status: ACTIVE | Noted: 2020-02-18

## 2020-02-18 PROCEDURE — 99214 OFFICE O/P EST MOD 30 MIN: CPT | Mod: 25

## 2020-02-18 PROCEDURE — ? LIQUID NITROGEN

## 2020-02-18 PROCEDURE — ? OBSERVATION AND MEASURE

## 2020-02-18 PROCEDURE — ? COUNSELING

## 2020-02-18 PROCEDURE — 17110 DESTRUCTION B9 LES UP TO 14: CPT

## 2020-02-18 ASSESSMENT — LOCATION SIMPLE DESCRIPTION DERM
LOCATION SIMPLE: RIGHT UPPER BACK
LOCATION SIMPLE: ABDOMEN
LOCATION SIMPLE: LEFT CHEEK
LOCATION SIMPLE: RIGHT HAND
LOCATION SIMPLE: UPPER BACK
LOCATION SIMPLE: CHEST

## 2020-02-18 ASSESSMENT — LOCATION DETAILED DESCRIPTION DERM
LOCATION DETAILED: RIGHT INFERIOR MEDIAL UPPER BACK
LOCATION DETAILED: STERNAL NOTCH
LOCATION DETAILED: RIGHT MEDIAL SUPERIOR CHEST
LOCATION DETAILED: MIDDLE STERNUM
LOCATION DETAILED: EPIGASTRIC SKIN
LOCATION DETAILED: SUPERIOR THORACIC SPINE
LOCATION DETAILED: RIGHT ULNAR DORSAL HAND
LOCATION DETAILED: LEFT MEDIAL MALAR CHEEK
LOCATION DETAILED: LEFT MEDIAL SUPERIOR CHEST

## 2020-02-18 ASSESSMENT — LOCATION ZONE DERM
LOCATION ZONE: FACE
LOCATION ZONE: HAND
LOCATION ZONE: TRUNK

## 2020-02-18 NOTE — PROCEDURE: COUNSELING
Detail Level: Detailed
Detail Level: Zone
Detail Level: Generalized
Patient Specific Counseling (Will Not Stick From Patient To Patient): Recommend hydrocortisone OTC for itch

## 2020-02-18 NOTE — PROCEDURE: LIQUID NITROGEN
Render Post-Care Instructions In Note?: no
Include Z78.9 (Other Specified Conditions Influencing Health Status) As An Associated Diagnosis?: Yes
Consent: The patient's consent was obtained including but not limited to risks of crusting, scabbing, blistering, scarring, darker or lighter pigmentary change, recurrence, incomplete removal and infection.
Medical Necessity Clause: This procedure was medically necessary because the lesions that were treated were:
Duration Of Freeze Thaw-Cycle (Seconds): 2
Medical Necessity Information: It is in your best interest to select a reason for this procedure from the list below. All of these items fulfill various CMS LCD requirements except the new and changing color options.
Detail Level: Detailed
Number Of Freeze-Thaw Cycles: 2 freeze-thaw cycles
Post-Care Instructions: I reviewed with the patient in detail post-care instructions. Patient is to wear sunprotection, and avoid picking at any of the treated lesions. Pt may apply Vaseline to crusted or scabbing areas.

## 2020-09-14 ENCOUNTER — APPOINTMENT (RX ONLY)
Dept: URBAN - METROPOLITAN AREA CLINIC 35 | Facility: CLINIC | Age: 60
Setting detail: DERMATOLOGY
End: 2020-09-14

## 2020-09-14 DIAGNOSIS — D485 NEOPLASM OF UNCERTAIN BEHAVIOR OF SKIN: ICD-10-CM

## 2020-09-14 PROBLEM — D48.5 NEOPLASM OF UNCERTAIN BEHAVIOR OF SKIN: Status: ACTIVE | Noted: 2020-09-14

## 2020-09-14 PROCEDURE — ? BIOPSY BY SHAVE METHOD

## 2020-09-14 PROCEDURE — 11102 TANGNTL BX SKIN SINGLE LES: CPT

## 2020-09-14 ASSESSMENT — LOCATION SIMPLE DESCRIPTION DERM: LOCATION SIMPLE: LEFT INDEX FINGER

## 2020-09-14 ASSESSMENT — LOCATION ZONE DERM: LOCATION ZONE: FINGER

## 2020-09-14 ASSESSMENT — LOCATION DETAILED DESCRIPTION DERM: LOCATION DETAILED: LEFT DISTAL DORSAL INDEX FINGER

## 2020-09-14 NOTE — PROCEDURE: BIOPSY BY SHAVE METHOD
Bill [id number] For Specimen Handling/Conveyance To Laboratory?: no
Type Of Destruction Used: Electrodesiccation and Curettage
Size Of Lesion In Cm: 0.4
Lab Facility: 
X Size Of Lesion In Cm: 0
Hemostasis: Aluminum Chloride
Biopsy Type: H and E
Anesthesia Type: 1% lidocaine with 1:100,000 epinephrine and a 1:10 solution of 8.4% sodium bicarbonate
Billing Type: Third-Party Bill
Post-Care Instructions: I reviewed with the patient in detail post-care instructions. Patient is to keep the biopsy site dry overnight, and then apply white petrolatum twice daily until healed. Patient may apply hydrogen peroxide soaks to remove any crusting.
Dressing: pressure dressing
Depth Of Biopsy: dermis
Notification Instructions: Patient will be notified of biopsy results. However, patient instructed to call the office if not contacted within 2 weeks.
Curettage Text: The wound bed was treated with curettage after the biopsy was performed.
Consent: Written consent was obtained and risks were reviewed including but not limited to scarring, infection, bleeding, scabbing, incomplete removal, nerve damage and allergy to anesthesia.
Detail Level: Detailed
Render Post-Care Instructions In Note?: yes
Lab: [id number]
Anesthesia Volume In Cc: 0.6
Wound Care: Petrolatum
Biopsy Method: Dermablade
Information: Selecting Yes will display possible errors in your note based on the variables you have selected. This validation is only offered as a suggestion for you. PLEASE NOTE THAT THE VALIDATION TEXT WILL BE REMOVED WHEN YOU FINALIZE YOUR NOTE. IF YOU WANT TO FAX A PRELIMINARY NOTE YOU WILL NEED TO TOGGLE THIS TO 'NO' IF YOU DO NOT WANT IT IN YOUR FAXED NOTE.

## 2021-02-22 ENCOUNTER — APPOINTMENT (RX ONLY)
Dept: URBAN - METROPOLITAN AREA CLINIC 35 | Facility: CLINIC | Age: 61
Setting detail: DERMATOLOGY
End: 2021-02-22

## 2021-02-22 DIAGNOSIS — Q82.8 OTHER SPECIFIED CONGENITAL MALFORMATIONS OF SKIN: ICD-10-CM

## 2021-02-22 DIAGNOSIS — L82.0 INFLAMED SEBORRHEIC KERATOSIS: ICD-10-CM

## 2021-02-22 DIAGNOSIS — D18.0 HEMANGIOMA: ICD-10-CM

## 2021-02-22 DIAGNOSIS — L81.4 OTHER MELANIN HYPERPIGMENTATION: ICD-10-CM

## 2021-02-22 DIAGNOSIS — D22 MELANOCYTIC NEVI: ICD-10-CM

## 2021-02-22 DIAGNOSIS — Z85.828 PERSONAL HISTORY OF OTHER MALIGNANT NEOPLASM OF SKIN: ICD-10-CM

## 2021-02-22 DIAGNOSIS — L82.1 OTHER SEBORRHEIC KERATOSIS: ICD-10-CM

## 2021-02-22 DIAGNOSIS — Z71.89 OTHER SPECIFIED COUNSELING: ICD-10-CM

## 2021-02-22 PROBLEM — D18.01 HEMANGIOMA OF SKIN AND SUBCUTANEOUS TISSUE: Status: ACTIVE | Noted: 2021-02-22

## 2021-02-22 PROBLEM — D22.5 MELANOCYTIC NEVI OF TRUNK: Status: ACTIVE | Noted: 2021-02-22

## 2021-02-22 PROCEDURE — 99213 OFFICE O/P EST LOW 20 MIN: CPT | Mod: 25

## 2021-02-22 PROCEDURE — ? OBSERVATION AND MEASURE

## 2021-02-22 PROCEDURE — 17111 DESTRUCTION B9 LESIONS 15/>: CPT | Mod: 52

## 2021-02-22 PROCEDURE — ? DIAGNOSIS COMMENT

## 2021-02-22 PROCEDURE — ? LIQUID NITROGEN

## 2021-02-22 PROCEDURE — ? SUNSCREEN RECOMMENDATIONS

## 2021-02-22 PROCEDURE — ? COUNSELING

## 2021-02-22 ASSESSMENT — LOCATION ZONE DERM
LOCATION ZONE: FINGER
LOCATION ZONE: ARM
LOCATION ZONE: TRUNK
LOCATION ZONE: HAND
LOCATION ZONE: NECK
LOCATION ZONE: FACE

## 2021-02-22 ASSESSMENT — LOCATION SIMPLE DESCRIPTION DERM
LOCATION SIMPLE: RIGHT UPPER BACK
LOCATION SIMPLE: RIGHT SHOULDER
LOCATION SIMPLE: LEFT CHEEK
LOCATION SIMPLE: RIGHT HAND
LOCATION SIMPLE: LEFT INDEX FINGER
LOCATION SIMPLE: ABDOMEN
LOCATION SIMPLE: RIGHT ANTERIOR NECK
LOCATION SIMPLE: CHEST
LOCATION SIMPLE: LEFT ANTERIOR NECK
LOCATION SIMPLE: POSTERIOR NECK

## 2021-02-22 ASSESSMENT — LOCATION DETAILED DESCRIPTION DERM
LOCATION DETAILED: LEFT CLAVICULAR NECK
LOCATION DETAILED: LEFT INFERIOR LATERAL NECK
LOCATION DETAILED: LEFT INFERIOR ANTERIOR NECK
LOCATION DETAILED: RIGHT INFERIOR MEDIAL UPPER BACK
LOCATION DETAILED: RIGHT MEDIAL SUPERIOR CHEST
LOCATION DETAILED: RIGHT LATERAL TRAPEZIAL NECK
LOCATION DETAILED: RIGHT ULNAR DORSAL HAND
LOCATION DETAILED: RIGHT SUPERIOR LATERAL NECK
LOCATION DETAILED: LEFT DISTAL DORSAL INDEX FINGER
LOCATION DETAILED: STERNAL NOTCH
LOCATION DETAILED: LEFT MEDIAL MALAR CHEEK
LOCATION DETAILED: LEFT SUPERIOR LATERAL NECK
LOCATION DETAILED: EPIGASTRIC SKIN
LOCATION DETAILED: RIGHT POSTERIOR SHOULDER
LOCATION DETAILED: RIGHT CLAVICULAR NECK
LOCATION DETAILED: RIGHT INFERIOR LATERAL NECK

## 2021-02-22 NOTE — PROCEDURE: DIAGNOSIS COMMENT
Detail Level: Detailed
Comment: Biopsy proven focal epidermal dyskeratosis
Render Risk Assessment In Note?: yes

## 2021-02-22 NOTE — PROCEDURE: COUNSELING
Detail Level: Detailed
Detail Level: Generalized
Patient Specific Counseling (Will Not Stick From Patient To Patient): Recommend hydrocortisone OTC for itch

## 2021-02-22 NOTE — PROCEDURE: LIQUID NITROGEN
Render Post-Care Instructions In Note?: yes
Number Of Freeze-Thaw Cycles: 2 freeze-thaw cycles
Consent: The patient's consent was obtained including but not limited to risks of crusting, scabbing, blistering, scarring, darker or lighter pigmentary change, recurrence, incomplete removal and infection.
Post-Care Instructions: I reviewed with the patient in detail post-care instructions. Patient is to wear sunprotection, and avoid picking at any of the treated lesions. Pt may apply Vaseline to crusted or scabbing areas.
Medical Necessity Clause: This procedure was medically necessary because the lesions that were treated were:
Medical Necessity Information: It is in your best interest to select a reason for this procedure from the list below. All of these items fulfill various CMS LCD requirements except the new and changing color options.
Detail Level: Detailed

## 2021-05-07 ENCOUNTER — OFFICE VISIT (OUTPATIENT)
Dept: CARDIOLOGY | Facility: MEDICAL CENTER | Age: 61
End: 2021-05-07
Payer: COMMERCIAL

## 2021-05-07 VITALS
SYSTOLIC BLOOD PRESSURE: 106 MMHG | HEART RATE: 64 BPM | DIASTOLIC BLOOD PRESSURE: 64 MMHG | OXYGEN SATURATION: 96 % | HEIGHT: 63 IN | BODY MASS INDEX: 28.53 KG/M2 | WEIGHT: 161 LBS | RESPIRATION RATE: 12 BRPM

## 2021-05-07 DIAGNOSIS — I48.0 PAF (PAROXYSMAL ATRIAL FIBRILLATION) (HCC): ICD-10-CM

## 2021-05-07 DIAGNOSIS — E78.5 DYSLIPIDEMIA: ICD-10-CM

## 2021-05-07 DIAGNOSIS — R93.1 AGATSTON CAC SCORE, <100: ICD-10-CM

## 2021-05-07 LAB — EKG IMPRESSION: NORMAL

## 2021-05-07 PROCEDURE — 93000 ELECTROCARDIOGRAM COMPLETE: CPT | Performed by: INTERNAL MEDICINE

## 2021-05-07 PROCEDURE — 99214 OFFICE O/P EST MOD 30 MIN: CPT | Performed by: INTERNAL MEDICINE

## 2021-05-07 NOTE — PROGRESS NOTES
"CARDIOLOGY OUTPATIENT FOLLOWUP    PCP: ANDRES Blevins.    1. PAF (paroxysmal atrial fibrillation) (HCC)    2. Dyslipidemia    3. Agatston CAC score 0.0 on 6/2018        Molly Jesus is stable from a cardiovascular standpoint with well-controlled cardiac risk factors.  She is infrequently bothered by atrial fibrillation.  I continue to recommend daily metoprolol with pill in pocket technique utilizing Rythmol plus an additional metoprolol.  She will continue aspirin.  We also reviewed the various potential treatment strategies in the future should the condition become more troubling.    Follow up: in 1 year    Chief Complaint   Patient presents with   • Hyperlipidemia   • Atrial Fibrillation     F/V Dx: PAF (paroxysmal atrial fibrillation) (HCC)       History: Molly Jesus is a 60 y.o. female with a history of paroxysmal atrial fibrillation presenting for follow-up.  The condition is confirmed to be present by ECG in 2015 media tab.  She has bothered by symptoms of A. fib approximately once per year.  She has never had to take propafenone outside of the hospital previously with success that pharmacologic cardioversion with this agent.  All episodes are short-lived-no more than several minutes.  Occasionally she will also experience palpitations.  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      ROS:   All other systems reviewed and negative except as per the HPI    PE:  /64 (BP Location: Left arm, Patient Position: Sitting, BP Cuff Size: Adult)   Pulse 64   Resp 12   Ht 1.6 m (5' 3\")   Wt 73 kg (161 lb)   SpO2 96%   BMI 28.52 kg/m²   Gen: no acute distress  HEENT: Symmetric face. Anicteric sclerae. Moist mucus membranes  NECK: No JVD. No lymphadenopathy  CARDIAC: Normal PMI, Normal S1, S2, No murmur  VASCULATURE: carotids are normal bilaterally without bruit  RESP: Clear to auscultation bilaterally  ABD: Soft, non-tender, non-distended  EXT: No " edema, no clubbing or cyanosis  SKIN: Warm and dry  NEURO: No gross deficits  PSYCH: Appropriate affect, participates in conversation    The ASCVD Risk score (Cande DC Jr, et al., 2013) failed to calculate.    Past Medical History:   Diagnosis Date   • Bipolar 1 disorder (HCC)    • Carpal tunnel syndrome    • GERD (gastroesophageal reflux disease)    • Hiatal hernia    • HLD (hyperlipidemia) 6/17/2015   • HLD (hyperlipidemia) 6/17/2015   • Hypothyroid    • Osteopenia    • Ulcerative colitis (HCC)      Allergies   Allergen Reactions   • Penicillins Hives   • Levaquin [Levofloxacin] Myalgia     Knee pain   • Morphine Vomiting     Severe vomiting     Outpatient Encounter Medications as of 5/7/2021   Medication Sig Dispense Refill   • PARoxetine (PAXIL) 20 MG Tab      • propafenone (RYTHMOL) 300 MG tablet Take 1 Tab by mouth 1 time daily as needed (prolonged palpitations from atrial fibrillation). 5 Tab 6   • Chlorpheniramine Maleate (ALLERGY PO) Take  by mouth.     • omeprazole (PRILOSEC) 40 MG delayed-release capsule Take 40 mg by mouth every day.     • montelukast (SINGULAIR) 10 MG Tab Take 10 mg by mouth every day.     • metoprolol SR (TOPROL XL) 50 MG TB24 Take 50 mg by mouth every day.     • aspirin (ASA) 81 MG CHEW chewable tablet Take 81 mg by mouth every day.     • levothyroxine (SYNTHROID) 25 MCG TABS Take 25 mcg by mouth Every morning on an empty stomach.     • mesalamine (LIALDA) 1.2 GM TBEC Take 1.2 g by mouth every morning with breakfast.     • therapeutic multivitamin-minerals (THERAGRAN-M) TABS Take 1 Tab by mouth every day.     • [DISCONTINUED] estradiol (ESTRACE) 0.1 MG/GM vaginal cream Insert  in vagina every 48 hours as needed.       No facility-administered encounter medications on file as of 5/7/2021.     Social History     Socioeconomic History   • Marital status:      Spouse name: Not on file   • Number of children: Not on file   • Years of education: Not on file   • Highest education  level: Not on file   Occupational History   • Not on file   Tobacco Use   • Smoking status: Former Smoker   • Smokeless tobacco: Never Used   Substance and Sexual Activity   • Alcohol use: Yes     Alcohol/week: 1.2 oz     Types: 2 Shots of liquor per week   • Drug use: No   • Sexual activity: Never   Other Topics Concern   • Not on file   Social History Narrative   • Not on file     Social Determinants of Health     Financial Resource Strain:    • Difficulty of Paying Living Expenses:    Food Insecurity:    • Worried About Running Out of Food in the Last Year:    • Ran Out of Food in the Last Year:    Transportation Needs:    • Lack of Transportation (Medical):    • Lack of Transportation (Non-Medical):    Physical Activity:    • Days of Exercise per Week:    • Minutes of Exercise per Session:    Stress:    • Feeling of Stress :    Social Connections:    • Frequency of Communication with Friends and Family:    • Frequency of Social Gatherings with Friends and Family:    • Attends Judaism Services:    • Active Member of Clubs or Organizations:    • Attends Club or Organization Meetings:    • Marital Status:    Intimate Partner Violence:    • Fear of Current or Ex-Partner:    • Emotionally Abused:    • Physically Abused:    • Sexually Abused:        Studies  No results found for: CHOLSTRLTOT, LDL, HDL, TRIGLYCERIDE    No results found for: SODIUM, POTASSIUM, CHLORIDE, CO2, GLUCOSE, BUN, CREATININE, BUNCREATRAT, GLOMRATE  No results found for: ALKPHOSPHAT, ASTSGOT, ALTSGPT, TBILIRUBIN     For this encounter I reviewed the following  (Cardiology notes-multiple) notes and Archive records in the media tab   For this encounter I directly reviewed ECG tracings. I agree with the interpretations in the electronic health record.

## 2021-05-07 NOTE — LETTER
May 7, 2021    To Whom It May Concern:     Molly Jesus was seen on 05/07/2021 for an annual check up on her cardiac conditions.         If you have any questions please do not hesitate to call me at the phone number listed below.    Sincerely,          Antonio Chadwick M.D  [phone]

## 2021-06-19 ENCOUNTER — HOSPITAL ENCOUNTER (OUTPATIENT)
Dept: RADIOLOGY | Facility: MEDICAL CENTER | Age: 61
End: 2021-06-19
Payer: COMMERCIAL

## 2021-06-19 ENCOUNTER — APPOINTMENT (OUTPATIENT)
Dept: CARDIOLOGY | Facility: MEDICAL CENTER | Age: 61
DRG: 166 | End: 2021-06-19
Attending: EMERGENCY MEDICINE
Payer: COMMERCIAL

## 2021-06-19 ENCOUNTER — HOSPITAL ENCOUNTER (INPATIENT)
Facility: MEDICAL CENTER | Age: 61
LOS: 3 days | DRG: 166 | End: 2021-06-22
Attending: EMERGENCY MEDICINE | Admitting: HOSPITALIST
Payer: COMMERCIAL

## 2021-06-19 DIAGNOSIS — J96.01 ACUTE HYPOXEMIC RESPIRATORY FAILURE (HCC): ICD-10-CM

## 2021-06-19 DIAGNOSIS — I26.99 OTHER ACUTE PULMONARY EMBOLISM, UNSPECIFIED WHETHER ACUTE COR PULMONALE PRESENT (HCC): ICD-10-CM

## 2021-06-19 LAB
ANION GAP SERPL CALC-SCNC: 14 MMOL/L (ref 7–16)
APTT PPP: >240 SEC (ref 24.7–36)
BASOPHILS # BLD AUTO: 0.4 % (ref 0–1.8)
BASOPHILS # BLD: 0.04 K/UL (ref 0–0.12)
BUN SERPL-MCNC: 8 MG/DL (ref 8–22)
CALCIUM SERPL-MCNC: 8.6 MG/DL (ref 8.5–10.5)
CFT BLD TEG: NORMAL MIN (ref 5–10)
CFT P HPASE BLD TEG: 9.8 MIN (ref 5–10)
CHLORIDE SERPL-SCNC: 107 MMOL/L (ref 96–112)
CLOT ANGLE BLD TEG: NORMAL DEGREES (ref 53–72)
CLOT ANGLE P HPASE BLD TEG: 62.2 DEGREES (ref 53–72)
CLOT INIT P HPASE BLD TEG: 2 MIN (ref 1–3)
CLOT LYSIS 30M P MA LENFR BLD TEG: 0 % (ref 0–8)
CLOT LYSIS 30M P MA LENFR BLD TEG: NORMAL % (ref 0–8)
CO2 SERPL-SCNC: 19 MMOL/L (ref 20–33)
CREAT SERPL-MCNC: 0.7 MG/DL (ref 0.5–1.4)
CT.EXTRINSIC BLD ROTEM: NORMAL MIN (ref 1–3)
EKG IMPRESSION: NORMAL
EOSINOPHIL # BLD AUTO: 0.04 K/UL (ref 0–0.51)
EOSINOPHIL NFR BLD: 0.4 % (ref 0–6.9)
ERYTHROCYTE [DISTWIDTH] IN BLOOD BY AUTOMATED COUNT: 49.1 FL (ref 35.9–50)
GLUCOSE SERPL-MCNC: 113 MG/DL (ref 65–99)
HCT VFR BLD AUTO: 40.5 % (ref 37–47)
HGB BLD-MCNC: 13.4 G/DL (ref 12–16)
IMM GRANULOCYTES # BLD AUTO: 0.03 K/UL (ref 0–0.11)
IMM GRANULOCYTES NFR BLD AUTO: 0.3 % (ref 0–0.9)
INR PPP: 1.14 (ref 0.87–1.13)
LV EJECT FRACT  99904: 60
LV EJECT FRACT MOD 2C 99903: 55.92
LV EJECT FRACT MOD 4C 99902: 64.6
LV EJECT FRACT MOD BP 99901: 59.96
LYMPHOCYTES # BLD AUTO: 2.26 K/UL (ref 1–4.8)
LYMPHOCYTES NFR BLD: 24.2 % (ref 22–41)
MCF BLD TEG: NORMAL MM (ref 50–70)
MCF P HPASE BLD TEG: 69.1 MM (ref 50–70)
MCH RBC QN AUTO: 31.8 PG (ref 27–33)
MCHC RBC AUTO-ENTMCNC: 33.1 G/DL (ref 33.6–35)
MCV RBC AUTO: 96 FL (ref 81.4–97.8)
MONOCYTES # BLD AUTO: 0.67 K/UL (ref 0–0.85)
MONOCYTES NFR BLD AUTO: 7.2 % (ref 0–13.4)
NEUTROPHILS # BLD AUTO: 6.29 K/UL (ref 2–7.15)
NEUTROPHILS NFR BLD: 67.5 % (ref 44–72)
NRBC # BLD AUTO: 0 K/UL
NRBC BLD-RTO: 0 /100 WBC
PLATELET # BLD AUTO: 311 K/UL (ref 164–446)
PMV BLD AUTO: 9.7 FL (ref 9–12.9)
POTASSIUM SERPL-SCNC: 4 MMOL/L (ref 3.6–5.5)
PROTHROMBIN TIME: 14.3 SEC (ref 12–14.6)
RBC # BLD AUTO: 4.22 M/UL (ref 4.2–5.4)
SODIUM SERPL-SCNC: 140 MMOL/L (ref 135–145)
TEG ALGORITHM TGALG: NORMAL
TROPONIN T SERPL-MCNC: 31 NG/L (ref 6–19)
UFH PPP CHRO-ACNC: >1.1 IU/ML
WBC # BLD AUTO: 9.3 K/UL (ref 4.8–10.8)

## 2021-06-19 PROCEDURE — 93005 ELECTROCARDIOGRAM TRACING: CPT | Performed by: EMERGENCY MEDICINE

## 2021-06-19 PROCEDURE — 85384 FIBRINOGEN ACTIVITY: CPT | Mod: 91

## 2021-06-19 PROCEDURE — 99285 EMERGENCY DEPT VISIT HI MDM: CPT

## 2021-06-19 PROCEDURE — 700111 HCHG RX REV CODE 636 W/ 250 OVERRIDE (IP)

## 2021-06-19 PROCEDURE — 700111 HCHG RX REV CODE 636 W/ 250 OVERRIDE (IP): Performed by: EMERGENCY MEDICINE

## 2021-06-19 PROCEDURE — 93005 ELECTROCARDIOGRAM TRACING: CPT

## 2021-06-19 PROCEDURE — 85347 COAGULATION TIME ACTIVATED: CPT

## 2021-06-19 PROCEDURE — U0005 INFEC AGEN DETEC AMPLI PROBE: HCPCS

## 2021-06-19 PROCEDURE — 85025 COMPLETE CBC W/AUTO DIFF WBC: CPT

## 2021-06-19 PROCEDURE — 85576 BLOOD PLATELET AGGREGATION: CPT | Mod: 91

## 2021-06-19 PROCEDURE — 99291 CRITICAL CARE FIRST HOUR: CPT | Performed by: INTERNAL MEDICINE

## 2021-06-19 PROCEDURE — 85730 THROMBOPLASTIN TIME PARTIAL: CPT

## 2021-06-19 PROCEDURE — U0003 INFECTIOUS AGENT DETECTION BY NUCLEIC ACID (DNA OR RNA); SEVERE ACUTE RESPIRATORY SYNDROME CORONAVIRUS 2 (SARS-COV-2) (CORONAVIRUS DISEASE [COVID-19]), AMPLIFIED PROBE TECHNIQUE, MAKING USE OF HIGH THROUGHPUT TECHNOLOGIES AS DESCRIBED BY CMS-2020-01-R: HCPCS

## 2021-06-19 PROCEDURE — 770020 HCHG ROOM/CARE - TELE (206)

## 2021-06-19 PROCEDURE — 93306 TTE W/DOPPLER COMPLETE: CPT

## 2021-06-19 PROCEDURE — 85520 HEPARIN ASSAY: CPT

## 2021-06-19 PROCEDURE — 99223 1ST HOSP IP/OBS HIGH 75: CPT | Performed by: HOSPITALIST

## 2021-06-19 PROCEDURE — 80048 BASIC METABOLIC PNL TOTAL CA: CPT

## 2021-06-19 PROCEDURE — 84484 ASSAY OF TROPONIN QUANT: CPT

## 2021-06-19 PROCEDURE — 96365 THER/PROPH/DIAG IV INF INIT: CPT

## 2021-06-19 PROCEDURE — 85610 PROTHROMBIN TIME: CPT

## 2021-06-19 PROCEDURE — 93306 TTE W/DOPPLER COMPLETE: CPT | Mod: 26 | Performed by: INTERNAL MEDICINE

## 2021-06-19 RX ORDER — HEPARIN SODIUM 5000 [USP'U]/100ML
0-30 INJECTION, SOLUTION INTRAVENOUS CONTINUOUS
Status: DISCONTINUED | OUTPATIENT
Start: 2021-06-19 | End: 2021-06-19

## 2021-06-19 RX ORDER — ONDANSETRON 4 MG/1
4 TABLET, ORALLY DISINTEGRATING ORAL EVERY 4 HOURS PRN
Status: DISCONTINUED | OUTPATIENT
Start: 2021-06-19 | End: 2021-06-22 | Stop reason: HOSPADM

## 2021-06-19 RX ORDER — HEPARIN SODIUM 5000 [USP'U]/100ML
INJECTION, SOLUTION INTRAVENOUS
Status: COMPLETED
Start: 2021-06-19 | End: 2021-06-19

## 2021-06-19 RX ORDER — AMOXICILLIN 250 MG
2 CAPSULE ORAL 2 TIMES DAILY
Status: DISCONTINUED | OUTPATIENT
Start: 2021-06-19 | End: 2021-06-22 | Stop reason: HOSPADM

## 2021-06-19 RX ORDER — HEPARIN SODIUM 5000 [USP'U]/100ML
0-30 INJECTION, SOLUTION INTRAVENOUS CONTINUOUS
Status: DISCONTINUED | OUTPATIENT
Start: 2021-06-19 | End: 2021-06-20

## 2021-06-19 RX ORDER — PROMETHAZINE HYDROCHLORIDE 25 MG/1
12.5-25 TABLET ORAL EVERY 4 HOURS PRN
Status: DISCONTINUED | OUTPATIENT
Start: 2021-06-19 | End: 2021-06-22 | Stop reason: HOSPADM

## 2021-06-19 RX ORDER — PROMETHAZINE HYDROCHLORIDE 25 MG/1
12.5-25 SUPPOSITORY RECTAL EVERY 4 HOURS PRN
Status: DISCONTINUED | OUTPATIENT
Start: 2021-06-19 | End: 2021-06-22 | Stop reason: HOSPADM

## 2021-06-19 RX ORDER — ENALAPRILAT 1.25 MG/ML
1.25 INJECTION INTRAVENOUS EVERY 6 HOURS PRN
Status: DISCONTINUED | OUTPATIENT
Start: 2021-06-19 | End: 2021-06-22 | Stop reason: HOSPADM

## 2021-06-19 RX ORDER — HEPARIN SODIUM 1000 [USP'U]/ML
40 INJECTION, SOLUTION INTRAVENOUS; SUBCUTANEOUS PRN
Status: DISCONTINUED | OUTPATIENT
Start: 2021-06-19 | End: 2021-06-19

## 2021-06-19 RX ORDER — CLONIDINE HYDROCHLORIDE 0.1 MG/1
0.1 TABLET ORAL EVERY 6 HOURS PRN
Status: DISCONTINUED | OUTPATIENT
Start: 2021-06-19 | End: 2021-06-22 | Stop reason: HOSPADM

## 2021-06-19 RX ORDER — LORATADINE 10 MG/1
10 TABLET ORAL 2 TIMES DAILY
COMMUNITY

## 2021-06-19 RX ORDER — POLYETHYLENE GLYCOL 3350 17 G/17G
1 POWDER, FOR SOLUTION ORAL
Status: DISCONTINUED | OUTPATIENT
Start: 2021-06-19 | End: 2021-06-22 | Stop reason: HOSPADM

## 2021-06-19 RX ORDER — LABETALOL HYDROCHLORIDE 5 MG/ML
10 INJECTION, SOLUTION INTRAVENOUS EVERY 4 HOURS PRN
Status: DISCONTINUED | OUTPATIENT
Start: 2021-06-19 | End: 2021-06-22 | Stop reason: HOSPADM

## 2021-06-19 RX ORDER — ONDANSETRON 2 MG/ML
4 INJECTION INTRAMUSCULAR; INTRAVENOUS EVERY 4 HOURS PRN
Status: DISCONTINUED | OUTPATIENT
Start: 2021-06-19 | End: 2021-06-22 | Stop reason: HOSPADM

## 2021-06-19 RX ORDER — ACETAMINOPHEN 325 MG/1
650 TABLET ORAL EVERY 6 HOURS PRN
Status: DISCONTINUED | OUTPATIENT
Start: 2021-06-19 | End: 2021-06-22 | Stop reason: HOSPADM

## 2021-06-19 RX ORDER — HEPARIN SODIUM 1000 [USP'U]/ML
40 INJECTION, SOLUTION INTRAVENOUS; SUBCUTANEOUS PRN
Status: DISCONTINUED | OUTPATIENT
Start: 2021-06-19 | End: 2021-06-20

## 2021-06-19 RX ORDER — PROCHLORPERAZINE EDISYLATE 5 MG/ML
5-10 INJECTION INTRAMUSCULAR; INTRAVENOUS EVERY 4 HOURS PRN
Status: DISCONTINUED | OUTPATIENT
Start: 2021-06-19 | End: 2021-06-22 | Stop reason: HOSPADM

## 2021-06-19 RX ORDER — BISACODYL 10 MG
10 SUPPOSITORY, RECTAL RECTAL
Status: DISCONTINUED | OUTPATIENT
Start: 2021-06-19 | End: 2021-06-22 | Stop reason: HOSPADM

## 2021-06-19 RX ADMIN — HEPARIN SODIUM 18 UNITS/KG/HR: 5000 INJECTION, SOLUTION INTRAVENOUS at 19:02

## 2021-06-19 RX ADMIN — HEPARIN SODIUM 15 UNITS/KG/HR: 5000 INJECTION, SOLUTION INTRAVENOUS at 21:32

## 2021-06-19 ASSESSMENT — ENCOUNTER SYMPTOMS
FEVER: 0
HEARTBURN: 0
SHORTNESS OF BREATH: 1
CHILLS: 0
NAUSEA: 0
DIZZINESS: 1
VOMITING: 0
PALPITATIONS: 1
ABDOMINAL PAIN: 0
LOSS OF CONSCIOUSNESS: 1

## 2021-06-19 ASSESSMENT — COGNITIVE AND FUNCTIONAL STATUS - GENERAL
DAILY ACTIVITIY SCORE: 24
SUGGESTED CMS G CODE MODIFIER MOBILITY: CH
SUGGESTED CMS G CODE MODIFIER DAILY ACTIVITY: CH
MOBILITY SCORE: 24

## 2021-06-19 ASSESSMENT — LIFESTYLE VARIABLES
EVER HAD A DRINK FIRST THING IN THE MORNING TO STEADY YOUR NERVES TO GET RID OF A HANGOVER: NO
AVERAGE NUMBER OF DAYS PER WEEK YOU HAVE A DRINK CONTAINING ALCOHOL: 4
CONSUMPTION TOTAL: POSITIVE
HAVE YOU EVER FELT YOU SHOULD CUT DOWN ON YOUR DRINKING: YES
HAVE PEOPLE ANNOYED YOU BY CRITICIZING YOUR DRINKING: NO
ON A TYPICAL DAY WHEN YOU DRINK ALCOHOL HOW MANY DRINKS DO YOU HAVE: 3
HOW MANY TIMES IN THE PAST YEAR HAVE YOU HAD 5 OR MORE DRINKS IN A DAY: 100
TOTAL SCORE: 2
DOES PATIENT WANT TO STOP DRINKING: NO
TOTAL SCORE: 2
TOTAL SCORE: 2
EVER FELT BAD OR GUILTY ABOUT YOUR DRINKING: YES
ALCOHOL_USE: YES

## 2021-06-19 ASSESSMENT — PATIENT HEALTH QUESTIONNAIRE - PHQ9
7. TROUBLE CONCENTRATING ON THINGS, SUCH AS READING THE NEWSPAPER OR WATCHING TELEVISION: NOT AT ALL
9. THOUGHTS THAT YOU WOULD BE BETTER OFF DEAD, OR OF HURTING YOURSELF: NOT AT ALL
SUM OF ALL RESPONSES TO PHQ QUESTIONS 1-9: 5
2. FEELING DOWN, DEPRESSED, IRRITABLE, OR HOPELESS: NOT AT ALL
SUM OF ALL RESPONSES TO PHQ9 QUESTIONS 1 AND 2: 3
4. FEELING TIRED OR HAVING LITTLE ENERGY: MORE THAN HALF THE DAYS
5. POOR APPETITE OR OVEREATING: NOT AT ALL
8. MOVING OR SPEAKING SO SLOWLY THAT OTHER PEOPLE COULD HAVE NOTICED. OR THE OPPOSITE, BEING SO FIGETY OR RESTLESS THAT YOU HAVE BEEN MOVING AROUND A LOT MORE THAN USUAL: NOT AT ALL
3. TROUBLE FALLING OR STAYING ASLEEP OR SLEEPING TOO MUCH: NOT AT ALL
6. FEELING BAD ABOUT YOURSELF - OR THAT YOU ARE A FAILURE OR HAVE LET YOURSELF OR YOUR FAMILY DOWN: NOT AL ALL
1. LITTLE INTEREST OR PLEASURE IN DOING THINGS: NEARLY EVERY DAY

## 2021-06-20 ENCOUNTER — APPOINTMENT (OUTPATIENT)
Dept: RADIOLOGY | Facility: MEDICAL CENTER | Age: 61
DRG: 166 | End: 2021-06-20
Attending: INTERNAL MEDICINE
Payer: COMMERCIAL

## 2021-06-20 ENCOUNTER — APPOINTMENT (OUTPATIENT)
Dept: RADIOLOGY | Facility: MEDICAL CENTER | Age: 61
DRG: 166 | End: 2021-06-20
Attending: RADIOLOGY
Payer: COMMERCIAL

## 2021-06-20 PROBLEM — J96.01 ACUTE HYPOXEMIC RESPIRATORY FAILURE (HCC): Status: ACTIVE | Noted: 2021-06-20

## 2021-06-20 PROBLEM — R07.9 CHEST PAIN: Status: ACTIVE | Noted: 2021-06-20

## 2021-06-20 PROBLEM — F32.A DEPRESSION: Status: ACTIVE | Noted: 2021-06-20

## 2021-06-20 LAB
ALBUMIN SERPL BCP-MCNC: 3 G/DL (ref 3.2–4.9)
ALBUMIN SERPL BCP-MCNC: 3.5 G/DL (ref 3.2–4.9)
ALBUMIN/GLOB SERPL: 1.4 G/DL
ALBUMIN/GLOB SERPL: 1.5 G/DL
ALP SERPL-CCNC: 67 U/L (ref 30–99)
ALP SERPL-CCNC: 79 U/L (ref 30–99)
ALT SERPL-CCNC: 28 U/L (ref 2–50)
ALT SERPL-CCNC: 38 U/L (ref 2–50)
ANION GAP SERPL CALC-SCNC: 10 MMOL/L (ref 7–16)
ANION GAP SERPL CALC-SCNC: 11 MMOL/L (ref 7–16)
APTT PPP: 49.8 SEC (ref 24.7–36)
AST SERPL-CCNC: 20 U/L (ref 12–45)
AST SERPL-CCNC: 29 U/L (ref 12–45)
BILIRUB SERPL-MCNC: 0.3 MG/DL (ref 0.1–1.5)
BILIRUB SERPL-MCNC: 0.3 MG/DL (ref 0.1–1.5)
BUN SERPL-MCNC: 5 MG/DL (ref 8–22)
BUN SERPL-MCNC: 6 MG/DL (ref 8–22)
CALCIUM SERPL-MCNC: 7.4 MG/DL (ref 8.5–10.5)
CALCIUM SERPL-MCNC: 8.2 MG/DL (ref 8.5–10.5)
CHLORIDE SERPL-SCNC: 109 MMOL/L (ref 96–112)
CHLORIDE SERPL-SCNC: 114 MMOL/L (ref 96–112)
CO2 SERPL-SCNC: 19 MMOL/L (ref 20–33)
CO2 SERPL-SCNC: 22 MMOL/L (ref 20–33)
CREAT SERPL-MCNC: 0.59 MG/DL (ref 0.5–1.4)
CREAT SERPL-MCNC: 0.71 MG/DL (ref 0.5–1.4)
EKG IMPRESSION: NORMAL
ERYTHROCYTE [DISTWIDTH] IN BLOOD BY AUTOMATED COUNT: 50.6 FL (ref 35.9–50)
ERYTHROCYTE [DISTWIDTH] IN BLOOD BY AUTOMATED COUNT: 50.6 FL (ref 35.9–50)
ERYTHROCYTE [DISTWIDTH] IN BLOOD BY AUTOMATED COUNT: 50.8 FL (ref 35.9–50)
ERYTHROCYTE [DISTWIDTH] IN BLOOD BY AUTOMATED COUNT: 51 FL (ref 35.9–50)
GLOBULIN SER CALC-MCNC: 2.2 G/DL (ref 1.9–3.5)
GLOBULIN SER CALC-MCNC: 2.4 G/DL (ref 1.9–3.5)
GLUCOSE SERPL-MCNC: 108 MG/DL (ref 65–99)
GLUCOSE SERPL-MCNC: 95 MG/DL (ref 65–99)
HCT VFR BLD AUTO: 35.4 % (ref 37–47)
HCT VFR BLD AUTO: 36.3 % (ref 37–47)
HCT VFR BLD AUTO: 37.5 % (ref 37–47)
HCT VFR BLD AUTO: 40.5 % (ref 37–47)
HGB BLD-MCNC: 11.4 G/DL (ref 12–16)
HGB BLD-MCNC: 11.6 G/DL (ref 12–16)
HGB BLD-MCNC: 12.2 G/DL (ref 12–16)
HGB BLD-MCNC: 12.8 G/DL (ref 12–16)
INR PPP: 1.16 (ref 0.87–1.13)
MAGNESIUM SERPL-MCNC: 1.6 MG/DL (ref 1.5–2.5)
MCH RBC QN AUTO: 31.4 PG (ref 27–33)
MCH RBC QN AUTO: 31.8 PG (ref 27–33)
MCH RBC QN AUTO: 31.8 PG (ref 27–33)
MCH RBC QN AUTO: 32.4 PG (ref 27–33)
MCHC RBC AUTO-ENTMCNC: 31.6 G/DL (ref 33.6–35)
MCHC RBC AUTO-ENTMCNC: 32 G/DL (ref 33.6–35)
MCHC RBC AUTO-ENTMCNC: 32.2 G/DL (ref 33.6–35)
MCHC RBC AUTO-ENTMCNC: 32.5 G/DL (ref 33.6–35)
MCV RBC AUTO: 98.9 FL (ref 81.4–97.8)
MCV RBC AUTO: 99.3 FL (ref 81.4–97.8)
MCV RBC AUTO: 99.5 FL (ref 81.4–97.8)
MCV RBC AUTO: 99.5 FL (ref 81.4–97.8)
PHOSPHATE SERPL-MCNC: 2.9 MG/DL (ref 2.5–4.5)
PLATELET # BLD AUTO: 215 K/UL (ref 164–446)
PLATELET # BLD AUTO: 222 K/UL (ref 164–446)
PLATELET # BLD AUTO: 238 K/UL (ref 164–446)
PLATELET # BLD AUTO: 266 K/UL (ref 164–446)
PMV BLD AUTO: 9.5 FL (ref 9–12.9)
PMV BLD AUTO: 9.7 FL (ref 9–12.9)
PMV BLD AUTO: 9.7 FL (ref 9–12.9)
PMV BLD AUTO: 9.8 FL (ref 9–12.9)
POTASSIUM SERPL-SCNC: 3.6 MMOL/L (ref 3.6–5.5)
POTASSIUM SERPL-SCNC: 4.1 MMOL/L (ref 3.6–5.5)
PROT SERPL-MCNC: 5.2 G/DL (ref 6–8.2)
PROT SERPL-MCNC: 5.9 G/DL (ref 6–8.2)
PROTHROMBIN TIME: 14.5 SEC (ref 12–14.6)
RBC # BLD AUTO: 3.58 M/UL (ref 4.2–5.4)
RBC # BLD AUTO: 3.65 M/UL (ref 4.2–5.4)
RBC # BLD AUTO: 3.77 M/UL (ref 4.2–5.4)
RBC # BLD AUTO: 4.08 M/UL (ref 4.2–5.4)
SARS-COV-2 RNA RESP QL NAA+PROBE: NOTDETECTED
SODIUM SERPL-SCNC: 142 MMOL/L (ref 135–145)
SODIUM SERPL-SCNC: 143 MMOL/L (ref 135–145)
SPECIMEN SOURCE: NORMAL
TROPONIN T SERPL-MCNC: 47 NG/L (ref 6–19)
UFH PPP CHRO-ACNC: 0.12 IU/ML
UFH PPP CHRO-ACNC: 0.42 IU/ML
UFH PPP CHRO-ACNC: 0.54 IU/ML
UFH PPP CHRO-ACNC: 0.56 IU/ML
WBC # BLD AUTO: 7.7 K/UL (ref 4.8–10.8)
WBC # BLD AUTO: 7.8 K/UL (ref 4.8–10.8)
WBC # BLD AUTO: 8.3 K/UL (ref 4.8–10.8)
WBC # BLD AUTO: 9.6 K/UL (ref 4.8–10.8)

## 2021-06-20 PROCEDURE — 700111 HCHG RX REV CODE 636 W/ 250 OVERRIDE (IP): Performed by: INTERNAL MEDICINE

## 2021-06-20 PROCEDURE — 3E06317 INTRODUCTION OF OTHER THROMBOLYTIC INTO CENTRAL ARTERY, PERCUTANEOUS APPROACH: ICD-10-PCS | Performed by: RADIOLOGY

## 2021-06-20 PROCEDURE — 93010 ELECTROCARDIOGRAM REPORT: CPT | Performed by: INTERNAL MEDICINE

## 2021-06-20 PROCEDURE — 93970 EXTREMITY STUDY: CPT

## 2021-06-20 PROCEDURE — 700101 HCHG RX REV CODE 250

## 2021-06-20 PROCEDURE — B31T1ZZ FLUOROSCOPY OF LEFT PULMONARY ARTERY USING LOW OSMOLAR CONTRAST: ICD-10-PCS | Performed by: RADIOLOGY

## 2021-06-20 PROCEDURE — 700102 HCHG RX REV CODE 250 W/ 637 OVERRIDE(OP): Performed by: INTERNAL MEDICINE

## 2021-06-20 PROCEDURE — A9270 NON-COVERED ITEM OR SERVICE: HCPCS | Performed by: INTERNAL MEDICINE

## 2021-06-20 PROCEDURE — B31S1ZZ FLUOROSCOPY OF RIGHT PULMONARY ARTERY USING LOW OSMOLAR CONTRAST: ICD-10-PCS | Performed by: RADIOLOGY

## 2021-06-20 PROCEDURE — 700101 HCHG RX REV CODE 250: Performed by: INTERNAL MEDICINE

## 2021-06-20 PROCEDURE — 85730 THROMBOPLASTIN TIME PARTIAL: CPT

## 2021-06-20 PROCEDURE — 700117 HCHG RX CONTRAST REV CODE 255: Performed by: RADIOLOGY

## 2021-06-20 PROCEDURE — 85027 COMPLETE CBC AUTOMATED: CPT

## 2021-06-20 PROCEDURE — 75743 ARTERY X-RAYS LUNGS: CPT

## 2021-06-20 PROCEDURE — 36015 PLACE CATHETER IN ARTERY: CPT

## 2021-06-20 PROCEDURE — 93005 ELECTROCARDIOGRAM TRACING: CPT | Performed by: INTERNAL MEDICINE

## 2021-06-20 PROCEDURE — 85610 PROTHROMBIN TIME: CPT

## 2021-06-20 PROCEDURE — 02FR3Z0 FRAGMENTATION OF LEFT PULMONARY ARTERY, PERCUTANEOUS APPROACH, ULTRASONIC: ICD-10-PCS | Performed by: RADIOLOGY

## 2021-06-20 PROCEDURE — 99233 SBSQ HOSP IP/OBS HIGH 50: CPT | Performed by: INTERNAL MEDICINE

## 2021-06-20 PROCEDURE — 84484 ASSAY OF TROPONIN QUANT: CPT

## 2021-06-20 PROCEDURE — 80053 COMPREHEN METABOLIC PANEL: CPT

## 2021-06-20 PROCEDURE — 37211 THROMBOLYTIC ART THERAPY: CPT

## 2021-06-20 PROCEDURE — 51798 US URINE CAPACITY MEASURE: CPT

## 2021-06-20 PROCEDURE — 770022 HCHG ROOM/CARE - ICU (200)

## 2021-06-20 PROCEDURE — 84100 ASSAY OF PHOSPHORUS: CPT

## 2021-06-20 PROCEDURE — 99292 CRITICAL CARE ADDL 30 MIN: CPT | Performed by: INTERNAL MEDICINE

## 2021-06-20 PROCEDURE — 700105 HCHG RX REV CODE 258: Performed by: RADIOLOGY

## 2021-06-20 PROCEDURE — 700111 HCHG RX REV CODE 636 W/ 250 OVERRIDE (IP)

## 2021-06-20 PROCEDURE — 04HY32Z INSERTION OF MONITORING DEVICE INTO LOWER ARTERY, PERCUTANEOUS APPROACH: ICD-10-PCS | Performed by: RADIOLOGY

## 2021-06-20 PROCEDURE — 02FQ3Z0 FRAGMENTATION OF RIGHT PULMONARY ARTERY, PERCUTANEOUS APPROACH, ULTRASONIC: ICD-10-PCS | Performed by: RADIOLOGY

## 2021-06-20 PROCEDURE — 85520 HEPARIN ASSAY: CPT

## 2021-06-20 PROCEDURE — 99291 CRITICAL CARE FIRST HOUR: CPT | Performed by: INTERNAL MEDICINE

## 2021-06-20 PROCEDURE — 83735 ASSAY OF MAGNESIUM: CPT

## 2021-06-20 PROCEDURE — 71045 X-RAY EXAM CHEST 1 VIEW: CPT

## 2021-06-20 PROCEDURE — 99153 MOD SED SAME PHYS/QHP EA: CPT

## 2021-06-20 PROCEDURE — 700111 HCHG RX REV CODE 636 W/ 250 OVERRIDE (IP): Performed by: RADIOLOGY

## 2021-06-20 RX ORDER — SODIUM CHLORIDE 9 MG/ML
INJECTION, SOLUTION INTRAVENOUS CONTINUOUS
Status: ACTIVE | OUTPATIENT
Start: 2021-06-20 | End: 2021-06-20

## 2021-06-20 RX ORDER — ONDANSETRON 2 MG/ML
INJECTION INTRAMUSCULAR; INTRAVENOUS
Status: COMPLETED
Start: 2021-06-20 | End: 2021-06-20

## 2021-06-20 RX ORDER — MAGNESIUM SULFATE HEPTAHYDRATE 40 MG/ML
4 INJECTION, SOLUTION INTRAVENOUS ONCE
Status: COMPLETED | OUTPATIENT
Start: 2021-06-20 | End: 2021-06-20

## 2021-06-20 RX ORDER — HEPARIN SODIUM 5000 [USP'U]/100ML
0-30 INJECTION, SOLUTION INTRAVENOUS CONTINUOUS
Status: DISCONTINUED | OUTPATIENT
Start: 2021-06-20 | End: 2021-06-21

## 2021-06-20 RX ORDER — PHENYLEPHRINE HCL IN 0.9% NACL 0.5 MG/5ML
30 SYRINGE (ML) INTRAVENOUS
Status: DISCONTINUED | OUTPATIENT
Start: 2021-06-20 | End: 2021-06-20

## 2021-06-20 RX ORDER — MIDAZOLAM HYDROCHLORIDE 1 MG/ML
.5-2 INJECTION INTRAMUSCULAR; INTRAVENOUS PRN
Status: ACTIVE | OUTPATIENT
Start: 2021-06-20 | End: 2021-06-20

## 2021-06-20 RX ORDER — ALUMINA, MAGNESIA, AND SIMETHICONE 2400; 2400; 240 MG/30ML; MG/30ML; MG/30ML
15 SUSPENSION ORAL 4 TIMES DAILY PRN
Status: DISCONTINUED | OUTPATIENT
Start: 2021-06-20 | End: 2021-06-22 | Stop reason: HOSPADM

## 2021-06-20 RX ORDER — OMEPRAZOLE 20 MG/1
40 CAPSULE, DELAYED RELEASE ORAL DAILY
Status: DISCONTINUED | OUTPATIENT
Start: 2021-06-20 | End: 2021-06-22 | Stop reason: HOSPADM

## 2021-06-20 RX ORDER — PHENYLEPHRINE HCL IN 0.9% NACL 0.5 MG/5ML
300 SYRINGE (ML) INTRAVENOUS
Status: ACTIVE | OUTPATIENT
Start: 2021-06-20 | End: 2021-06-20

## 2021-06-20 RX ORDER — HEPARIN SODIUM 1000 [USP'U]/ML
40 INJECTION, SOLUTION INTRAVENOUS; SUBCUTANEOUS PRN
Status: DISCONTINUED | OUTPATIENT
Start: 2021-06-20 | End: 2021-06-21

## 2021-06-20 RX ORDER — HEPARIN SODIUM 5000 [USP'U]/100ML
500 INJECTION, SOLUTION INTRAVENOUS CONTINUOUS
Status: ACTIVE | OUTPATIENT
Start: 2021-06-20 | End: 2021-06-20

## 2021-06-20 RX ORDER — LEVOTHYROXINE SODIUM 0.03 MG/1
25 TABLET ORAL
Status: DISCONTINUED | OUTPATIENT
Start: 2021-06-20 | End: 2021-06-22 | Stop reason: HOSPADM

## 2021-06-20 RX ORDER — PHENYLEPHRINE HCL IN 0.9% NACL 0.5 MG/5ML
SYRINGE (ML) INTRAVENOUS
Status: DISPENSED
Start: 2021-06-20 | End: 2021-06-20

## 2021-06-20 RX ORDER — NOREPINEPHRINE BITARTRATE 0.03 MG/ML
0-30 INJECTION, SOLUTION INTRAVENOUS CONTINUOUS
Status: DISCONTINUED | OUTPATIENT
Start: 2021-06-20 | End: 2021-06-21

## 2021-06-20 RX ORDER — ONDANSETRON 2 MG/ML
4 INJECTION INTRAMUSCULAR; INTRAVENOUS PRN
Status: ACTIVE | OUTPATIENT
Start: 2021-06-20 | End: 2021-06-20

## 2021-06-20 RX ORDER — NOREPINEPHRINE BITARTRATE 1 MG/ML
INJECTION, SOLUTION INTRAVENOUS
Status: DISPENSED
Start: 2021-06-20 | End: 2021-06-20

## 2021-06-20 RX ORDER — DILTIAZEM HYDROCHLORIDE 5 MG/ML
5 INJECTION INTRAVENOUS ONCE
Status: COMPLETED | OUTPATIENT
Start: 2021-06-20 | End: 2021-06-20

## 2021-06-20 RX ORDER — METOPROLOL SUCCINATE 25 MG/1
50 TABLET, EXTENDED RELEASE ORAL DAILY
Status: DISCONTINUED | OUTPATIENT
Start: 2021-06-20 | End: 2021-06-20

## 2021-06-20 RX ORDER — PAROXETINE HYDROCHLORIDE 20 MG/1
20 TABLET, FILM COATED ORAL DAILY
Status: DISCONTINUED | OUTPATIENT
Start: 2021-06-20 | End: 2021-06-22 | Stop reason: HOSPADM

## 2021-06-20 RX ORDER — PHENYLEPHRINE HYDROCHLORIDE 10 MG/ML
INJECTION, SOLUTION INTRAMUSCULAR; INTRAVENOUS; SUBCUTANEOUS
Status: DISPENSED
Start: 2021-06-20 | End: 2021-06-20

## 2021-06-20 RX ORDER — LIDOCAINE HYDROCHLORIDE 10 MG/ML
INJECTION, SOLUTION INFILTRATION; PERINEURAL
Status: COMPLETED
Start: 2021-06-20 | End: 2021-06-20

## 2021-06-20 RX ORDER — HEPARIN SODIUM 1000 [USP'U]/ML
INJECTION, SOLUTION INTRAVENOUS; SUBCUTANEOUS
Status: COMPLETED
Start: 2021-06-20 | End: 2021-06-20

## 2021-06-20 RX ORDER — MIDAZOLAM HYDROCHLORIDE 1 MG/ML
INJECTION INTRAMUSCULAR; INTRAVENOUS
Status: COMPLETED
Start: 2021-06-20 | End: 2021-06-20

## 2021-06-20 RX ORDER — MESALAMINE 400 MG/1
2400 CAPSULE, DELAYED RELEASE ORAL 2 TIMES DAILY
Status: DISCONTINUED | OUTPATIENT
Start: 2021-06-20 | End: 2021-06-22 | Stop reason: HOSPADM

## 2021-06-20 RX ORDER — SODIUM CHLORIDE 9 MG/ML
500 INJECTION, SOLUTION INTRAVENOUS
Status: ACTIVE | OUTPATIENT
Start: 2021-06-20 | End: 2021-06-20

## 2021-06-20 RX ADMIN — SODIUM CHLORIDE: 9 INJECTION, SOLUTION INTRAVENOUS at 10:18

## 2021-06-20 RX ADMIN — ONDANSETRON 4 MG: 2 INJECTION INTRAMUSCULAR; INTRAVENOUS at 09:17

## 2021-06-20 RX ADMIN — ALTEPLASE 1 MG/HR: KIT at 10:12

## 2021-06-20 RX ADMIN — MAGNESIUM SULFATE IN WATER 4 G: 40 INJECTION, SOLUTION INTRAVENOUS at 15:31

## 2021-06-20 RX ADMIN — HEPARIN SODIUM 18 UNITS/KG/HR: 5000 INJECTION, SOLUTION INTRAVENOUS at 14:50

## 2021-06-20 RX ADMIN — MIDAZOLAM HYDROCHLORIDE 2 MG: 1 INJECTION, SOLUTION INTRAMUSCULAR; INTRAVENOUS at 08:59

## 2021-06-20 RX ADMIN — MESALAMINE 2400 MG: 400 CAPSULE, DELAYED RELEASE ORAL at 21:17

## 2021-06-20 RX ADMIN — HEPARIN SODIUM: 1000 INJECTION, SOLUTION INTRAVENOUS; SUBCUTANEOUS at 08:30

## 2021-06-20 RX ADMIN — FENTANYL CITRATE 50 MCG: 50 INJECTION, SOLUTION INTRAMUSCULAR; INTRAVENOUS at 12:06

## 2021-06-20 RX ADMIN — HEPARIN SODIUM 500 UNITS/HR: 5000 INJECTION, SOLUTION INTRAVENOUS at 10:15

## 2021-06-20 RX ADMIN — LIDOCAINE HYDROCHLORIDE: 10 INJECTION, SOLUTION INFILTRATION; PERINEURAL at 10:00

## 2021-06-20 RX ADMIN — DILTIAZEM HYDROCHLORIDE 5 MG: 5 INJECTION INTRAVENOUS at 09:45

## 2021-06-20 RX ADMIN — MIDAZOLAM HYDROCHLORIDE 1 MG: 1 INJECTION, SOLUTION INTRAMUSCULAR; INTRAVENOUS at 09:14

## 2021-06-20 RX ADMIN — NOREPINEPHRINE BITARTRATE 10 MCG/MIN: 1 INJECTION INTRAVENOUS at 10:45

## 2021-06-20 RX ADMIN — IOHEXOL 30 ML: 300 INJECTION, SOLUTION INTRAVENOUS at 09:39

## 2021-06-20 RX ADMIN — SODIUM CHLORIDE: 9 INJECTION, SOLUTION INTRAVENOUS at 10:20

## 2021-06-20 RX ADMIN — FENTANYL CITRATE 25 MCG: 50 INJECTION, SOLUTION INTRAMUSCULAR; INTRAVENOUS at 08:59

## 2021-06-20 RX ADMIN — ALTEPLASE 1 MG/HR: KIT at 10:17

## 2021-06-20 RX ADMIN — Medication 300 MCG: at 09:50

## 2021-06-20 RX ADMIN — SODIUM CHLORIDE: 9 INJECTION, SOLUTION INTRAVENOUS at 10:14

## 2021-06-20 RX ADMIN — ALUMINUM HYDROXIDE, MAGNESIUM HYDROXIDE, AND DIMETHICONE 15 ML: 400; 400; 40 SUSPENSION ORAL at 16:11

## 2021-06-20 ASSESSMENT — ENCOUNTER SYMPTOMS
WEAKNESS: 0
SHORTNESS OF BREATH: 0
SENSORY CHANGE: 0
FLANK PAIN: 0
SPUTUM PRODUCTION: 0
FOCAL WEAKNESS: 0
NAUSEA: 1
HEADACHES: 0
SEIZURES: 0
CLAUDICATION: 0
DEPRESSION: 0
DOUBLE VISION: 0
PALPITATIONS: 0
MYALGIAS: 0
NAUSEA: 0
DIZZINESS: 0
SPEECH CHANGE: 0
NECK PAIN: 0
COUGH: 0
ORTHOPNEA: 0
HEARTBURN: 0
EYE DISCHARGE: 0
SORE THROAT: 0
BRUISES/BLEEDS EASILY: 0
BACK PAIN: 0
BLOOD IN STOOL: 0
DIARRHEA: 1
SHORTNESS OF BREATH: 1
VOMITING: 0
DIARRHEA: 0
ABDOMINAL PAIN: 0
PHOTOPHOBIA: 0
CHILLS: 0
HEMOPTYSIS: 0
BLURRED VISION: 0
FEVER: 0
EYE PAIN: 0

## 2021-06-20 ASSESSMENT — PAIN DESCRIPTION - PAIN TYPE
TYPE: ACUTE PAIN

## 2021-06-20 NOTE — ASSESSMENT & PLAN NOTE
Bilateral PE  Associated with shock  S/p EKOS tPA  Continue heparin  BP goal MAP > 65  LE doppler  Troponin reordered 47  CXR repeat pedning  Lipid panel ordered  Patient will need hypercoagulable workup

## 2021-06-20 NOTE — OR SURGEON
Immediate Post- Operative Note        PostOp Diagnosis: acute shock      Procedure(s): art line LEFT groin      Estimated Blood Loss: Less than 5 ml        Complications: None            6/20/2021     9:59 AM     Jamar Strickland M.D.

## 2021-06-20 NOTE — PROGRESS NOTES
Assumed care of patient, received report from NOC CARLOS Parks. Updated on POC, call light within reach and fall precautions in place. Patient is alert and oriented X 4 and resting comfortably. Bed is locked and in the lowest position.

## 2021-06-20 NOTE — PROGRESS NOTES
[facility] ICU Progress Note      Admit Date: 6/19/2021    Resident(s): Chava Garcia M.D.   Attending:  EDDI KAN/ Dr. Harris    Patient ID:    Name:  Molly Jesus   YOB: 1960  Age:  61 y.o.  female   MRN:  [id number]    Hospital Course (carried forward and updated):  Molly Jesus is a 61 y.o. female with past medical history of atrial fibrillation was transferred to [facility] from [place] after she was found to have bilateral PE. Patient has medical history of atrial fibrillation and was only on aspirin. Patients states that she was recently diagnosed with superficial vein thrombus in her lower leg and was briefly on Eliquis for about 10 days starting beginning of this month. On friday, she noted that felt increasing shortness of breath and on Saturday she developed syncopal episodes. Patient reports her father passed away at age of 40's and her brother's child was also affected by blood clots. She quit smoking in 2002. No recent travel. No history of cancer. Patient is not aware of any history of hypercoaguable disorders.    Consultants:  Critical Care  Interventional Radiatioly     Interval Events:  Rapid response in interventional radiology  Patient was undergoing EKOS catheter with lysis initiation placement for pulmonary embolism  Patient with BP low 61/45  Norepinephrine given in IR suite  BP responded nad increased to 114/66 over next 1 hour  Bedside echo dilated RV and no IVC collapse  Patient given amiodarone bolus and converted to SR  Patient complains about more severe chest pain band like, worse with inspiration  EKG obtained shows sinus rhythm  Arterial line left placed  CXR pending  Troponin now 47 with prior 31 yesterday    Review of Systems   Constitutional: Positive for malaise/fatigue. Negative for chills and fever.   HENT: Negative for hearing loss and nosebleeds.    Eyes: Negative for blurred vision and pain.   Respiratory: Positive for shortness  "of breath (stable).    Cardiovascular: Positive for chest pain (lower chest, band like, pressure).   Gastrointestinal: Positive for diarrhea (chronic) and nausea. Negative for vomiting.   Genitourinary: Negative for dysuria and hematuria.   Musculoskeletal: Negative for back pain and neck pain.   Neurological: Negative for focal weakness and seizures.     PHYSICAL EXAM:  Vitals:    06/20/21 0944 06/20/21 0949 06/20/21 0954 06/20/21 0959   BP: (!) 89/79 (!) 97/69 (!) 95/79 (!) 99/80   Pulse: (!) 114 (!) 125 (!) 124 (!) 139   Resp: (!) 23 (!) 23 (!) 24 (!) 24   Temp:       TempSrc:       SpO2: 95% 95% 95% 96%   Weight:       Height:        Body mass index is 29.45 kg/m².  Latest Vitals:  BP (!) 99/80   Pulse (!) 139   Temp 36.3 °C (97.4 °F) (Temporal)   Resp (!) 24   Ht 1.6 m (5' 3\")   Wt 75.4 kg (166 lb 3.6 oz)   SpO2 96%   BMI 29.45 kg/m²   O2 therapy: Pulse Oximetry: 96 %, O2 (LPM): 6, O2 Delivery Device: Nasal Cannula  Vitals Range last 24h:  Temp:  [36.2 °C (97.2 °F)-37.2 °C (98.9 °F)] 36.3 °C (97.4 °F)  Pulse:  [] 139  Resp:  [16-28] 24  BP: ()/(45-85) 99/80  SpO2:  [88 %-97 %] 96 %     No intake or output data in the 24 hours ending 06/20/21 1129     Physical Exam  Constitutional:       Appearance: Normal appearance.   HENT:      Head: Normocephalic and atraumatic.      Nose: Nose normal.      Mouth/Throat:      Mouth: Mucous membranes are moist.   Eyes:      Extraocular Movements: Extraocular movements intact.      Pupils: Pupils are equal, round, and reactive to light.   Cardiovascular:      Rate and Rhythm: Normal rate and regular rhythm.      Pulses: Normal pulses.      Heart sounds: No murmur heard.     Pulmonary:      Effort: Pulmonary effort is normal.      Breath sounds: Normal breath sounds.   Abdominal:      General: Abdomen is flat. There is no distension.      Palpations: Abdomen is soft.   Skin:     General: Skin is warm and dry.      Comments: Right groin cathether "   Neurological:      General: No focal deficit present.      Mental Status: She is alert and oriented to person, place, and time.             Recent Labs     06/19/21 1834 06/20/21  0309   SODIUM 140 142   POTASSIUM 4.0 4.1   CHLORIDE 107 109   CO2 19* 22   BUN 8 6*   CREATININE 0.70 0.71   CALCIUM 8.6 8.2*     Recent Labs     06/19/21 1834 06/20/21  0309   ALTSGPT  --  38   ASTSGOT  --  29   ALKPHOSPHAT  --  79   TBILIRUBIN  --  0.3   GLUCOSE 113* 108*     Recent Labs     06/19/21 1834 06/20/21  0309   RBC 4.22 4.08*   HEMOGLOBIN 13.4 12.8   HEMATOCRIT 40.5 40.5   PLATELETCT 311 266   PROTHROMBTM 14.3  --    APTT >240.0*  --    INR 1.14*  --      Recent Labs     06/19/21 1834 06/20/21  0309   WBC 9.3 8.3   NEUTSPOLYS 67.50  --    LYMPHOCYTES 24.20  --    MONOCYTES 7.20  --    EOSINOPHILS 0.40  --    BASOPHILS 0.40  --    ASTSGOT  --  29   ALTSGPT  --  38   ALKPHOSPHAT  --  79   TBILIRUBIN  --  0.3       Meds:  • alteplase (ACTIVASE) infusion  1 mg/hr 1 mg/hr (06/20/21 1012)   • alteplase (ACTIVASE) infusion  1 mg/hr 1 mg/hr (06/20/21 1017)   • heparin  500 Units/hr 500 Units/hr (06/20/21 1015)   • NS   30 mL/hr at 06/20/21 1020   • NS   120 mL/hr at 06/20/21 1018   • NS   120 mL/hr at 06/20/21 1014   • levothyroxine  25 mcg     • mesalamine delayed-release  2,400 mg     • metoprolol SR  50 mg     • omeprazole  40 mg     • PARoxetine  20 mg     • heparin       • heparin  0-30 Units/kg/hr (Adjusted)     • heparin  40 Units/kg (Adjusted)     • norepinephrine       • phenylephrine       • phenylephrine       • lidocaine       • senna-docusate  2 tablet      And   • polyethylene glycol/lytes  1 Packet      And   • magnesium hydroxide  30 mL      And   • bisacodyl  10 mg     • cloNIDine  0.1 mg     • enalaprilat  1.25 mg     • labetalol  10 mg     • ondansetron  4 mg     • ondansetron  4 mg     • promethazine  12.5-25 mg     • promethazine  12.5-25 mg     • prochlorperazine  5-10 mg     • acetaminophen  650 mg         Imaging:  EC-ECHOCARDIOGRAM COMPLETE W/O CONT   Final Result      OUTSIDE IMAGES-CT CHEST   Final Result      IR-PULMONARY ANGIOGRAM-BILATERAL    (Results Pending)   DX-CHEST-PORTABLE (1 VIEW)    (Results Pending)   US-EXTREMITY VENOUS LOWER BILAT    (Results Pending)       Problem and Plan:  * Pulmonary embolism (HCC)  Assessment & Plan  Bilateral PE  Associated with shock  S/p EKOS tPA  Continue heparin  BP goal MAP > 65  LE doppler  Troponin reordered 47  CXR repeat pedning  Lipid panel ordered  Patient will need hypercoagulable workup    Acute hypoxemic respiratory failure (HCC)  Assessment & Plan  Due to to pulmonary embolism  Status post EKOS  See pulmonary embolism    Chest pain  Assessment & Plan  Likely result of PE  Troponin 47 repeat  EKG shows T wave abnormalities  CXR pending  Monitor deonna for symptoms    Depression  Assessment & Plan  Continue paroxetene    PAF (paroxysmal atrial fibrillation) (HCC)- (present on admission)  Assessment & Plan  Patient previously on   Converted to SR with amiodarone  Withholding home metoprolol  Patient on heparin full dose     HLD (hyperlipidemia)- (present on admission)  Assessment & Plan  Lipid panel    Hypothyroidism- (present on admission)  Assessment & Plan  Continue levothyroxine    DISPO: ICU    CODE STATUS: Full    Quality Measures:  Feeding: NPO  Analgesia: prn  Sedation: prn  Thromboprophylaxis: heparin full dose  Head of bed: >30 degrees  Ulcer prophylaxis: none  Glycemic control: prn  Bowel care: bowel regimen  Indwelling lines: PIVs  Deescalation of antibiotics: none    Chava Garcia M.D.

## 2021-06-20 NOTE — ASSESSMENT & PLAN NOTE
Extensive bilateral pulmonary emboli with echocardiogram evidence of right heart strain RVSP 60 mmHg  Status post EKOS therapy on 6/21 with ICU admission and subsequent IV heparin drip.  DVT studies negative  Change to Xarelto on 6/21

## 2021-06-20 NOTE — PROGRESS NOTES
Critical Care Medicine Faculty Progress Note    Brief HPI/Problem list:  61 y F with Bipolar, GERD, hiatal hernia, chol, UC, recent afib dx and hypothyroidism that works as a . May 30th she was dx of superficial clot and was taken off anticoagulation. She presents from [place] after syncope found to have PE and transferred here. She was evaluated by CCM yesterday. She today went to IR for Ekos catheter based. In ER patient right after catheters placed had SNT to 180s and was given 5mg of IV cardiazem and had shock to SBP 60's. I came emergently to bedside and neosynephrine 3ml or 300mcg IV was given. With improved hemodynamics. Her mentation was good the entire time. She was complaining of chest pain. Bedside echo showed dilated RV with mcconnel sign but function preserved and large IVC without collapse.     Exam:  Laying flat on IR table, no acute distress  Neuro: mentation normal answer briskly  Heart: Irregular irregular tachycardia no murmer  Abd: obese flat soft  EXT: catheters in right groin. 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 Critical care time provided was 38 additional minutes re-evaluating and taking appropriate hx and titration of norepinephrine from 40 minutes of stabilization in IR. This excludes all separate billable procedures.     Please see [facility] notes for additional  documentation    Torres Harris MD  Critical Care Medicine

## 2021-06-20 NOTE — ED PROVIDER NOTES
ED Provider Note    Scribed for Torres Rivero M.D. by Torres Rivero M.D.. 6/19/2021,  6:27 PM.    CHIEF COMPLAINT  Chief Complaint   Patient presents with   • Shortness of Breath   • Near Syncopal     x 2 today       HPI  Molly Jesus is a 61 y.o. female who presents to the Emergency Department as a transfer from [facility] for extensive bilateral PEs.  She says that she had several syncopal episodes this morning, without trauma, but associated with shortness of breath and chest pain.  She has a remote history of paroxysmal A. fib, but is not on blood thinners.  This has been discussed with cardiology in the past, but she declined.  She says that a week ago, she fell and hit her leg in the rocks while fishing, so she has several risk factors.  She has not had fevers or cough or hemoptysis.  At the outside hospital, she was found to have an elevated D-dimer as well as elevated troponin.  There was right heart strain noted on her CT, and she was hypoxic, requiring 2 to 3 L of oxygen.  She is still requiring 3 L here, though is calm, chest pain has resolved, and appears well.  She does remain tachycardic.  Exertion was the only exacerbating factor for her shortness of breath earlier today.  Her outside hospital EKG was nonischemic.  Her outside hospital chest x-ray was unremarkable.    REVIEW OF SYSTEMS  See HPI for further details. All other systems are negative.     PAST MEDICAL HISTORY   has a past medical history of Bipolar 1 disorder (HCC), Carpal tunnel syndrome, GERD (gastroesophageal reflux disease), Hiatal hernia, HLD (hyperlipidemia) (6/17/2015), HLD (hyperlipidemia) (6/17/2015), Hypothyroid, Osteopenia, and Ulcerative colitis (HCC).    SOCIAL HISTORY  Social History     Tobacco Use   • Smoking status: Former Smoker   • Smokeless tobacco: Never Used   Vaping Use   • Vaping Use: Never used   Substance and Sexual Activity   • Alcohol use: Yes     Alcohol/week: 1.2 oz     Types: 2  "Shots of liquor per week   • Drug use: No   • Sexual activity: Never     Social History     Substance and Sexual Activity   Drug Use No       SURGICAL HISTORY   has a past surgical history that includes hysterectomy, total abdominal and knee arthroscopy.    CURRENT MEDICATIONS  Home Medications     Reviewed by Royce Rosario (Pharmacy Tech) on 06/19/21 at 1920  Med List Status: Complete   Medication Last Dose Status   aspirin (ASA) 81 MG CHEW chewable tablet 6/18/2021 Active   levothyroxine (SYNTHROID) 25 MCG TABS 6/19/2021 Active   loratadine (CLARITIN) 10 MG Tab 6/19/2021 Active   mesalamine (LIALDA) 1.2 GM TBEC 6/18/2021 Active   metoprolol SR (TOPROL XL) 50 MG TB24 6/18/2021 Active   montelukast (SINGULAIR) 10 MG Tab 6/16/2021 Active   omeprazole (PRILOSEC) 40 MG delayed-release capsule 6/19/2021 Active   PARoxetine (PAXIL) 20 MG Tab 6/19/2021 Active   propafenone (RYTHMOL) 300 MG tablet NOT TAKING Active                ALLERGIES  Allergies   Allergen Reactions   • Penicillins Hives   • Levaquin [Levofloxacin] Myalgia     Knee pain   • Morphine Vomiting     Severe vomiting       PHYSICAL EXAM  VITAL SIGNS: /65   Pulse 90   Temp 37.2 °C (98.9 °F) (Temporal)   Resp (!) 26   Ht 1.6 m (5' 3\")   Wt 74.4 kg (164 lb)   SpO2 94%   BMI 29.05 kg/m²   Pulse ox interpretation: I interpret this pulse ox as normal.  Constitutional: Alert in no apparent distress.  HENT: No signs of trauma, Bilateral external ears normal, Nose normal.   Eyes: Conjunctiva normal, Non-icteric.   Neck: Normal range of motion, Supple, No stridor.   Lymphatic: No lymphadenopathy noted.   Cardiovascular: Regular tachycardic rate and rhythm, no murmurs.   Thorax & Lungs: Normal breath sounds, No respiratory distress, No wheezing, No chest tenderness.   Abdomen: Bowel sounds normal, Soft, No tenderness, No masses, No pulsatile masses. No peritoneal signs.  Skin: Warm, Dry, No erythema, No rash.   Extremities: Intact distal pulses, No " edema, No cyanosis.  Musculoskeletal: Good range of motion in all major joints. No or major deformities noted.   Neurologic: Alert , Normal motor function, Normal sensory function, No focal deficits noted.   Psychiatric: Affect normal, Judgment normal, Mood normal.     DIAGNOSTIC STUDIES / PROCEDURES    EKG  Interpreted by me    Results for orders placed or performed during the hospital encounter of 21   EKG   Result Value Ref Range    Report       [facility] Emergency Dept.    Test Date:  2021  Pt Name:    FLEX SHIN             Department: ER  MRN:        [id number]                      Room:        11  Gender:     Female                       Technician: STUDENT  :        1960                   Requested By:ER TRIAGE PROTOCOL  Order #:    [id number]                    Reading MD:    Measurements  Intervals                                Axis  Rate:       104                          P:          -9  TX:         148                          QRS:        17  QRSD:       86                           T:          -39  QT:         368  QTc:        484    Interpretive Statements  SINUS TACHYCARDIA  NONSPECIFIC T ABNORMALITIES, INFERIOR LEADS  Compared to ECG 2021 11:06:01  T-wave abnormality now present  Sinus rhythm no longer present           LABS  Labs Reviewed   CBC WITH DIFFERENTIAL - Abnormal; Notable for the following components:       Result Value    MCHC 33.1 (*)     All other components within normal limits    Narrative:     Indicate which anticoagulants the patient is on:->HEPARIN   BASIC METABOLIC PANEL - Abnormal; Notable for the following components:    Co2 19 (*)     Glucose 113 (*)     All other components within normal limits    Narrative:     Indicate which anticoagulants the patient is on:->HEPARIN   TROPONIN - Abnormal; Notable for the following components:    Troponin T 31 (*)     All other components within normal limits    Narrative:     Indicate  which anticoagulants the patient is on:->HEPARIN   APTT - Abnormal; Notable for the following components:    APTT >240.0 (*)     All other components within normal limits    Narrative:     Indicate which anticoagulants the patient is on:->HEPARIN   PROTHROMBIN TIME - Abnormal; Notable for the following components:    INR 1.14 (*)     All other components within normal limits    Narrative:     Indicate which anticoagulants the patient is on:->HEPARIN   HEPARIN XA (UNFRACTIONATED) - Abnormal; Notable for the following components:    Heparin Xa (UFH) >1.10 (*)     All other components within normal limits    Narrative:     Indicate which anticoagulants the patient is on:->HEPARIN   SARS-COV-2, PCR (IN-HOUSE)    Narrative:     Have you been in close contact with a person who is suspected  or known to be positive for COVID-19 within the last 30 days  (e.g. last seen that person < 30 days ago)->Unknown   ESTIMATED GFR    Narrative:     Indicate which anticoagulants the patient is on:->HEPARIN   BASIC TEG    Narrative:     Is the patient on heparin (including low molecular weight  heparin, subcutaneous heparin, or lovenox)?->Yes  Do you want to extend TEG graph to LY30? (If no, graph will  terminate at MA)->Yes   BASIC TEG W HEPARINASE    Narrative:     Is the patient on heparin (including low molecular weight  heparin, subcutaneous heparin, or lovenox)?->Yes  Do you want to extend TEG graph to LY30? (If no, graph will  terminate at MA)->Yes     All labs reviewed by me.    RADIOLOGY  EC-ECHOCARDIOGRAM COMPLETE W/O CONT   Final Result      OUTSIDE IMAGES-CT CHEST   Final Result      IR-UNLISTED FLUORO INTERVENTIONAL PROC    (Results Pending)     The radiologist's interpretation of all radiological studies have been reviewed by me.    COURSE & MEDICAL DECISION MAKING  Nursing notes, NORBERTO DAVISHx reviewed in chart.     6:27 PM Patient seen and examined at bedside. She has a known diagnosis of bilateral PEs with a suggestion of  right heart strain on her CT. She will need a stat cardiac ultrasound to consider whether EKOS would be of benefit. I reviewed her lab tests, and she had a troponin elevation. I have spoken with pharmacy about continuing her heparin drip, and placed appropriate orders. I paged the hospitalist and the ICU for admission and consultation.    6:46 PM I spoke with Dr. Pantoja, ICU, who will evaluate. I've ordered a STAT echo. I spoke briefly with Dr. Strickland, the interventional radiologist, to make them aware of this patient and that she may be a candidate for EKOS. He will look out for her CT scan results and cardiac ultrasound and will be consulted by the admitting team, if appropriate.    7:00 PM Jeannette Pantoja and Goyo, the ICU and hospitalist docs, are at the bedside. I've called the film room for assistance with the CT images that stil aren't visible.     9:55 PM the patient's cardiac ultrasound shows new RV dysfunction. The admitting hospitalist, Dr. Nance, has arranged for EKOS in the morning.    DISPOSITION:  Patient will be admitted to Dr. Nance in guarded condition.      FINAL IMPRESSION  1. Bilateral pulmonary emboli  2. Hypoxia  3. Right heart strain  4. Elevated troponin    This patient was transferred to us because of the potential for critical illness with deterioration due to bilateral pulmonary emboli.  The patient continues to have: Diffuse bilateral pulmonary emboli as it causing troponin elevation, shortness of breath, tachycardia, hypoxia, and right heart strain.  The vital organ system that is affected is the: Heart and circulatory.  If untreated there is a high chance of deterioration into: Worsening cardiac strain, tachycardia, hypoxia, increasing clot burden, circulatory or respiratory collapse,  And eventually death.   The critical care that I am providing today is: Supportive respiratory care, anticoagulation, reviewing her outside medical record, reviewing multiple abnormal laboratory tests,  repeating relevant tests here, gauging the patient's response to treatment, consulting with interventional radiology, the ICU physician, and the admitting hospitalist.  The critical that has been undertaken is medically complex.   There has been no overlap in critical care time.   Critical Care Time not including procedures: 40

## 2021-06-20 NOTE — H&P
Hospital Medicine History & Physical Note    Date of Service  6/19/2021    Primary Care Physician  ANDRES Blevins.    Consultants  Pulmonary/critical care medicine, their input is pending,    Code Status  Full Code    Chief Complaint  Chief Complaint   Patient presents with   • Shortness of Breath   • Near Syncopal     x 2 today       History of Presenting Illness  61 y.o. female who presented 6/19/2021 with multiple fainting episodes this morning.  She also reports that she was feeling somewhat short of breath yesterday, which was unusual for her.  She was doing no particularly strenuous activities such as watering her garden when she had dyspnea.  No particular triggering factors for her fainting episode this morning, she reports she was simply feeding the cats.  She presented for evaluation at outside hospital, large pulmonary embolism was seen on CT scan of the chest and she was airlifted to our facility for ongoing care.  Acuity is acute, severity is severe, location is cardiopulmonary, course is progressive, priors are none, risk factors include occupational , associated symptoms of dyspnea.    Review of Systems  All systems reviewed and negative except as noted per above.    Past Medical History   has a past medical history of Bipolar 1 disorder (HCC), Carpal tunnel syndrome, GERD (gastroesophageal reflux disease), Hiatal hernia, HLD (hyperlipidemia) (6/17/2015), HLD (hyperlipidemia) (6/17/2015), Hypothyroid, Osteopenia, and Ulcerative colitis (HCC).    Surgical History   has a past surgical history that includes hysterectomy, total abdominal and knee arthroscopy.     Family History  family history includes Heart Disease in her sister.     Social History   reports that she has quit smoking. She has never used smokeless tobacco. She reports current alcohol use of about 1.2 oz of alcohol per week. She reports that she does not use drugs.    Allergies  Allergies   Allergen Reactions   •  Penicillins Hives   • Levaquin [Levofloxacin] Myalgia     Knee pain   • Morphine Vomiting     Severe vomiting       Medications  Prior to Admission Medications   Prescriptions Last Dose Informant Patient Reported? Taking?   Chlorpheniramine Maleate (ALLERGY PO)   Yes No   Sig: Take  by mouth.   PARoxetine (PAXIL) 20 MG Tab   Yes No   aspirin (ASA) 81 MG CHEW chewable tablet   Yes No   Sig: Take 81 mg by mouth every day.   levothyroxine (SYNTHROID) 25 MCG TABS   Yes No   Sig: Take 25 mcg by mouth Every morning on an empty stomach.   mesalamine (LIALDA) 1.2 GM TBEC   Yes No   Sig: Take 1.2 g by mouth every morning with breakfast.   metoprolol SR (TOPROL XL) 50 MG TB24   Yes No   Sig: Take 50 mg by mouth every day.   montelukast (SINGULAIR) 10 MG Tab   Yes No   Sig: Take 10 mg by mouth every day.   omeprazole (PRILOSEC) 40 MG delayed-release capsule   Yes No   Sig: Take 40 mg by mouth every day.   propafenone (RYTHMOL) 300 MG tablet   No No   Sig: Take 1 Tab by mouth 1 time daily as needed (prolonged palpitations from atrial fibrillation).   therapeutic multivitamin-minerals (THERAGRAN-M) TABS   Yes No   Sig: Take 1 Tab by mouth every day.      Facility-Administered Medications: None       Physical Exam  Temp:  [37.2 °C (98.9 °F)] 37.2 °C (98.9 °F)  Pulse:  [112] 112  Resp:  [18] 18  BP: (111)/(79) 111/79  SpO2:  [93 %] 93 %    General: No acute distress  HEENT atraumatic, normocephalic, pupils equal round reactive to light  Neck: No JVD  Chest: Respirations are unlabored  Cardiac: Physiologic S1 and S2  Abdomen: Soft, nontender, nondistended  Extremities: Without clubbing, cyanosis or edema  Neuro: Cranial nerves II through XII are grossly intact.  Psych: No anxiety, judgement intact.          Laboratory:  Recent Labs     06/19/21  1834   WBC 9.3   RBC 4.22   HEMOGLOBIN 13.4   HEMATOCRIT 40.5   MCV 96.0   MCH 31.8   MCHC 33.1*   RDW 49.1   PLATELETCT 311   MPV 9.7         No results for input(s): ALTSGPT, ASTSGOT,  ALKPHOSPHAT, TBILIRUBIN, DBILIRUBIN, GAMMAGT, AMYLASE, LIPASE, ALB, PREALBUMIN, GLUCOSE in the last 72 hours.      No results for input(s): NTPROBNP in the last 72 hours.      No results for input(s): TROPONINT in the last 72 hours.    Imaging:  OUTSIDE IMAGES-CT CHEST   Final Result      EC-ECHOCARDIOGRAM COMPLETE W/O CONT    (Results Pending)         Assessment/Plan:  I anticipate this patient will require at least two midnights for appropriate medical management, necessitating inpatient admission.    Pulmonary embolism (HCC)  Assessment & Plan  Transthoracic echocardiogram will be checked, if there is evidence of cor pulmonale patient may benefit from catheter-based intervention.    PAF (paroxysmal atrial fibrillation) (HCC)  Assessment & Plan  Patient was recently taking Eliquis for anticoagulation along with a baby aspirin and metoprolol.  Will be on a heparin drip for now, may be able to transition back to Eliquis once hemodynamically stabilized and off oxygen.  Continue home dose beta-blockade.  Continue home dose ASA

## 2021-06-20 NOTE — PROGRESS NOTES
Lab called with critical result of aPTT of >240 and heparin xa >1.1 at 20:23. Critical lab result read back to lab. Dr. Nance notified of critical lab result at 20:30. Critical lab result read back by Dr. Nance. Heparin gtt stopped per protocol.

## 2021-06-20 NOTE — ED NOTES
EKG completed.   Blood drawn and sent to lab.   Covid swab collected and sent to lab.   Seen by ERP.   Admitting MD at bedside now.

## 2021-06-20 NOTE — PROGRESS NOTES
Pt received from ED. Tele monitor in place and monitor room notified. VSS. Pt oriented to room. Educated on use of the call light. Pt demonstrated use of the call light. Discussed POC. All questions answered. Fall precautions in place. Patient on heparin gtt, verified with second RN.

## 2021-06-20 NOTE — ED NOTES
Report called to CARLOS Parks. Pt VSS prior to transfer. No further questions at this time from receiving RN.

## 2021-06-20 NOTE — CONSULTS
Critical Care Consultation    Date of consult: 2021    Referring Physician  Schuyler Nance M.D.    Reason for Consultation  Pulmonary embolism    History of Presenting Illness  61 y.o. female who presented 2021 with SOB.  Pt is a  who drives long distances.  Pt was recently diagnosed with a fib and started on Eliquis.  On 10 Marquise 2021, the pt stopped taking her Eliquis because she felt like it was sitting in her chest.  Today, the pt has SOB with exertion and several syncopal episodes.  The pt presented to OSH ER and had CTA Chest PE protocol which showed b/l PE's.  Pt is requiring 3L NC to maintain oxygen saturations above 90%.  Pt is able to speak in full sentences and is not working to breathe.  Pt states that her father and her brother both  from blood clots.  Pt does not know if she has a clotting disorder.      Code Status  Full Code    Review of Systems  Review of Systems   Constitutional: Positive for malaise/fatigue. Negative for chills and fever.   Respiratory: Positive for shortness of breath.    Cardiovascular: Positive for palpitations and leg swelling.   Gastrointestinal: Negative for abdominal pain, heartburn, nausea and vomiting.   Neurological: Positive for dizziness and loss of consciousness.       Past Medical History   has a past medical history of Bipolar 1 disorder (HCC), Carpal tunnel syndrome, GERD (gastroesophageal reflux disease), Hiatal hernia, HLD (hyperlipidemia) (2015), HLD (hyperlipidemia) (2015), Hypothyroid, Osteopenia, and Ulcerative colitis (HCC).    Surgical History   has a past surgical history that includes hysterectomy, total abdominal and knee arthroscopy.    Family History  family history includes Heart Disease in her sister.    Social History   reports that she has quit smoking. She has never used smokeless tobacco. She reports current alcohol use of about 1.2 oz of alcohol per week. She reports that she does not use drugs.    Medications  Home  Medications     Reviewed by Sandra Pisano R.N. (Registered Nurse) on 06/19/21 at 1822  Med List Status: Partial   Medication Last Dose Status   aspirin (ASA) 81 MG CHEW chewable tablet  Active   Chlorpheniramine Maleate (ALLERGY PO)  Active   levothyroxine (SYNTHROID) 25 MCG TABS  Active   mesalamine (LIALDA) 1.2 GM TBEC  Active   metoprolol SR (TOPROL XL) 50 MG TB24  Active   montelukast (SINGULAIR) 10 MG Tab  Active   omeprazole (PRILOSEC) 40 MG delayed-release capsule  Active   PARoxetine (PAXIL) 20 MG Tab  Active   propafenone (RYTHMOL) 300 MG tablet  Active   therapeutic multivitamin-minerals (THERAGRAN-M) TABS  Active              Current Facility-Administered Medications   Medication Dose Route Frequency Provider Last Rate Last Admin   • heparin infusion 25,000 units in 500 mL 0.45% NACL  0-30 Units/kg/hr (Adjusted) Intravenous Continuous Torres Rivero M.D. 22 mL/hr at 06/19/21 1902 18 Units/kg/hr at 06/19/21 1902   • heparin injection 2,400 Units  40 Units/kg (Adjusted) Intravenous PRN Torres Rivero M.D.       • senna-docusate (PERICOLACE or SENOKOT S) 8.6-50 MG per tablet 2 tablet  2 tablet Oral BID Schuyler Nance M.D.        And   • polyethylene glycol/lytes (MIRALAX) PACKET 1 Packet  1 Packet Oral QDAY PRN Schuyler Nance M.D.        And   • magnesium hydroxide (MILK OF MAGNESIA) suspension 30 mL  30 mL Oral QDAY PRN Schuyler Nance M.D.        And   • bisacodyl (DULCOLAX) suppository 10 mg  10 mg Rectal QDAY PRN Schuyler Nance M.D.       • cloNIDine (CATAPRES) tablet 0.1 mg  0.1 mg Oral Q6HRS PRN Schuyler Nance M.D.       • enalaprilat (VASOTEC) injection 1.25 mg  1.25 mg Intravenous Q6HRS PRN Schuyler Nance M.D.       • labetalol (NORMODYNE/TRANDATE) injection 10 mg  10 mg Intravenous Q4HRS PRN Schuyler Nance M.D.       • ondansetron (ZOFRAN) syringe/vial injection 4 mg  4 mg Intravenous Q4HRS PRN Schuyler Nance M.D.       • ondansetron (ZOFRAN ODT) dispertab 4 mg  4 mg Oral Q4HRS  PRN Schuyler Nance M.D.       • promethazine (PHENERGAN) tablet 12.5-25 mg  12.5-25 mg Oral Q4HRS PRN Schuyler Nance M.D.       • promethazine (PHENERGAN) suppository 12.5-25 mg  12.5-25 mg Rectal Q4HRS PRN Schuyler Nance M.D.       • prochlorperazine (COMPAZINE) injection 5-10 mg  5-10 mg Intravenous Q4HRS PRN Schuyler Nance M.D.       • acetaminophen (Tylenol) tablet 650 mg  650 mg Oral Q6HRS PRN Schuyler Nance M.D.         Current Outpatient Medications   Medication Sig Dispense Refill   • PARoxetine (PAXIL) 20 MG Tab      • propafenone (RYTHMOL) 300 MG tablet Take 1 Tab by mouth 1 time daily as needed (prolonged palpitations from atrial fibrillation). 5 Tab 6   • Chlorpheniramine Maleate (ALLERGY PO) Take  by mouth.     • omeprazole (PRILOSEC) 40 MG delayed-release capsule Take 40 mg by mouth every day.     • montelukast (SINGULAIR) 10 MG Tab Take 10 mg by mouth every day.     • metoprolol SR (TOPROL XL) 50 MG TB24 Take 50 mg by mouth every day.     • aspirin (ASA) 81 MG CHEW chewable tablet Take 81 mg by mouth every day.     • levothyroxine (SYNTHROID) 25 MCG TABS Take 25 mcg by mouth Every morning on an empty stomach.     • mesalamine (LIALDA) 1.2 GM TBEC Take 1.2 g by mouth every morning with breakfast.     • therapeutic multivitamin-minerals (THERAGRAN-M) TABS Take 1 Tab by mouth every day.         Allergies  Allergies   Allergen Reactions   • Penicillins Hives   • Levaquin [Levofloxacin] Myalgia     Knee pain   • Morphine Vomiting     Severe vomiting       Vital Signs last 24 hours  Temp:  [37.2 °C (98.9 °F)] 37.2 °C (98.9 °F)  Pulse:  [112] 112  Resp:  [18] 18  BP: (111)/(79) 111/79  SpO2:  [93 %] 93 %    Physical Exam  Physical Exam  Constitutional:       Appearance: Normal appearance.   HENT:      Head: Normocephalic and atraumatic.      Nose: Nose normal.      Mouth/Throat:      Mouth: Mucous membranes are moist.      Pharynx: Oropharynx is clear.   Eyes:      Extraocular Movements: Extraocular  movements intact.      Conjunctiva/sclera: Conjunctivae normal.      Pupils: Pupils are equal, round, and reactive to light.   Cardiovascular:      Rate and Rhythm: Tachycardia present.      Pulses: Normal pulses.      Heart sounds: Normal heart sounds.   Pulmonary:      Effort: Pulmonary effort is normal.      Breath sounds: Normal breath sounds. No wheezing.   Abdominal:      General: There is no distension.      Palpations: Abdomen is soft.      Tenderness: There is no abdominal tenderness. There is no guarding.   Musculoskeletal:         General: No tenderness.      Cervical back: Normal range of motion and neck supple.      Right lower leg: Edema present.      Left lower leg: Edema present.   Skin:     General: Skin is warm and dry.      Capillary Refill: Capillary refill takes less than 2 seconds.   Neurological:      Mental Status: She is alert and oriented to person, place, and time.      Cranial Nerves: No cranial nerve deficit.         Fluids  No intake or output data in the 24 hours ending 21    Laboratory  Recent Results (from the past 48 hour(s))   EKG    Collection Time: 21  6:13 PM   Result Value Ref Range    Report       [facility] Emergency Dept.    Test Date:  2021  Pt Name:    FLEX SHIN             Department: ER  MRN:        [id number]                      Room:        11  Gender:     Female                       Technician: STUDENT  :        1960                   Requested By:ER TRIAGE PROTOCOL  Order #:    [id number]                    Reading MD:    Measurements  Intervals                                Axis  Rate:       104                          P:          -9  VA:         148                          QRS:        17  QRSD:       86                           T:          -39  QT:         368  QTc:        484    Interpretive Statements  SINUS TACHYCARDIA  NONSPECIFIC T ABNORMALITIES, INFERIOR LEADS  Compared to ECG 2021  11:06:01  T-wave abnormality now present  Sinus rhythm no longer present     CBC WITH DIFFERENTIAL    Collection Time: 06/19/21  6:34 PM   Result Value Ref Range    WBC 9.3 4.8 - 10.8 K/uL    RBC 4.22 4.20 - 5.40 M/uL    Hemoglobin 13.4 12.0 - 16.0 g/dL    Hematocrit 40.5 37.0 - 47.0 %    MCV 96.0 81.4 - 97.8 fL    MCH 31.8 27.0 - 33.0 pg    MCHC 33.1 (L) 33.6 - 35.0 g/dL    RDW 49.1 35.9 - 50.0 fL    Platelet Count 311 164 - 446 K/uL    MPV 9.7 9.0 - 12.9 fL    Neutrophils-Polys 67.50 44.00 - 72.00 %    Lymphocytes 24.20 22.00 - 41.00 %    Monocytes 7.20 0.00 - 13.40 %    Eosinophils 0.40 0.00 - 6.90 %    Basophils 0.40 0.00 - 1.80 %    Immature Granulocytes 0.30 0.00 - 0.90 %    Nucleated RBC 0.00 /100 WBC    Neutrophils (Absolute) 6.29 2.00 - 7.15 K/uL    Lymphs (Absolute) 2.26 1.00 - 4.80 K/uL    Monos (Absolute) 0.67 0.00 - 0.85 K/uL    Eos (Absolute) 0.04 0.00 - 0.51 K/uL    Baso (Absolute) 0.04 0.00 - 0.12 K/uL    Immature Granulocytes (abs) 0.03 0.00 - 0.11 K/uL    NRBC (Absolute) 0.00 K/uL   Basic Metabolic Panel    Collection Time: 06/19/21  6:34 PM   Result Value Ref Range    Sodium 140 135 - 145 mmol/L    Potassium 4.0 3.6 - 5.5 mmol/L    Chloride 107 96 - 112 mmol/L    Co2 19 (L) 20 - 33 mmol/L    Glucose 113 (H) 65 - 99 mg/dL    Bun 8 8 - 22 mg/dL    Creatinine 0.70 0.50 - 1.40 mg/dL    Calcium 8.6 8.5 - 10.5 mg/dL    Anion Gap 14.0 7.0 - 16.0   TROPONIN    Collection Time: 06/19/21  6:34 PM   Result Value Ref Range    Troponin T 31 (H) 6 - 19 ng/L   ESTIMATED GFR    Collection Time: 06/19/21  6:34 PM   Result Value Ref Range    GFR If African American >60 >60 mL/min/1.73 m 2    GFR If Non African American >60 >60 mL/min/1.73 m 2   SARS-CoV-2 PCR (24 hour In-House): Collect NP swab in VT    Collection Time: 06/19/21  6:42 PM    Specimen: Respirate   Result Value Ref Range    SARS-CoV-2 Source NP Swab        Imaging  OUTSIDE IMAGES-CT CHEST   Final Result      EC-ECHOCARDIOGRAM COMPLETE W/O CONT     (Results Pending)       Assessment/Plan  Pulmonary embolism (HCC)  Assessment & Plan  Pt is a 62 yo  female who presented to OSH with SOB and syncopal episodes.  At OSH pt was found to have b/l PE.  Pt transferred to [facility] for further evaluation and treatment.  Pt started on heparin gtt and requiring 3L oxygen via nasal cannula to maintain oxygen saturations above 92%.  Pt appears comfortable, and is able to speak in full sentences.  Pt is not using accessory muscles for respiration.  Pt is slightly tachycardic at 109 bpm.  Pt is normotensive and is breathing at 24 respirations per min.      - Pt does not appear to have a critical care need at this time.  Pt is saturating well with only 3L oxygen via nasal cannula.  - Pt is receiving heparin gtt  - Pt can be treated on telemetery floor.  If pt's condition deteriorates, critical care can re-evaluate the pt and transfer to higher level of care.  However, at this time pt appears comfortable and is receiving appropriate therapy.  - Of note pt states she has family hx of blood clots in her relatives.  Pt would benefit from hypercoagulable workup.  - Please contact Critical Care if pt's condition changes.  - Awaiting echocardiogram to determine if right heart strain.         Discussed patient condition and risk of morbidity and/or mortality with Hospitalist and Patient.    The patient remains critically ill.  Critical care time = 47 minutes in directly providing and coordinating critical care and extensive data review.  No time overlap and excludes procedures.

## 2021-06-20 NOTE — OR SURGEON
Immediate Post- Operative Note        PostOp Diagnosis: BILATERAL submassive PE      Procedure(s): BILATERAL pulmonary EKOS catheter placement with lysis initiation      Estimated Blood Loss: Less than 5 ml        Complications: None            6/20/2021     9:31 AM     Jamar Strickland M.D.

## 2021-06-20 NOTE — PROGRESS NOTES
Hospital Medicine Daily Progress Note    Date of Service  6/20/2021    Chief Complaint  61 y.o. female admitted 6/19/2021 with shortness of breath    Hospital Course    61 y.o. female who presented 6/19/2021 with multiple fainting episodes this morning.  She also reports that she was feeling somewhat short of breath yesterday, which was unusual for her.  She was doing no particularly strenuous activities such as watering her garden when she had dyspnea.  No particular triggering factors for her fainting episode this morning, she reports she was simply feeding the cats.  She presented for evaluation at outside hospital, large pulmonary embolism was seen on CT scan of the chest and she was airlifted to our facility for ongoing care.  Acuity is acute, severity is severe, location is cardiopulmonary, course is progressive, priors are none, risk factors include occupational , associated symptoms of dyspnea.    Interval Problem Update    6/20/2021: The patient was seen and evaluated at bedside and appears comfortable.  She is admitted for further evaluation and management of bilateral pulmonary embolism.  Patient requiring 3 L via nasal cannula.  Echocardiogram was done and showed some right ventricular strain.  Images reviewed by interventional radiology with current recommendations for EKOS.  She tolerated the procedure well and is currently being observed in the medical ICU.  At the time of my examination, patient denied any chest pains, palpitations, or shortness of breath.  No other overnight events reported.    Consultants/Specialty  Interventional radiology  Critical care    Code Status  Full Code    Disposition  Pending PT/OT evaluation    Review of Systems  Review of Systems   Constitutional: Negative for chills, fever and malaise/fatigue.   HENT: Negative for congestion, hearing loss, sore throat and tinnitus.    Eyes: Negative for blurred vision, double vision, photophobia and discharge.    Respiratory: Negative for cough, hemoptysis, sputum production and shortness of breath.    Cardiovascular: Negative for chest pain, palpitations, orthopnea, claudication and leg swelling.   Gastrointestinal: Negative for abdominal pain, blood in stool, diarrhea, heartburn, melena, nausea and vomiting.   Genitourinary: Negative for dysuria, flank pain, hematuria and urgency.   Musculoskeletal: Negative for back pain, joint pain and myalgias.   Skin: Negative for itching and rash.   Neurological: Negative for dizziness, sensory change, speech change, weakness and headaches.   Endo/Heme/Allergies: Does not bruise/bleed easily.   Psychiatric/Behavioral: Negative for depression and suicidal ideas.        Physical Exam  Temp:  [36.2 °C (97.2 °F)-37.2 °C (98.9 °F)] 36.3 °C (97.4 °F)  Pulse:  [] 82  Resp:  [16-28] 24  BP: ()/(45-85) 114/66  SpO2:  [88 %-98 %] 97 %    Physical Exam  Vitals and nursing note reviewed.   Constitutional:       General: She is not in acute distress.     Appearance: Normal appearance.   HENT:      Head: Normocephalic and atraumatic.      Mouth/Throat:      Mouth: Mucous membranes are moist.   Eyes:      Extraocular Movements: Extraocular movements intact.      Conjunctiva/sclera: Conjunctivae normal.      Pupils: Pupils are equal, round, and reactive to light.   Cardiovascular:      Rate and Rhythm: Normal rate and regular rhythm.      Heart sounds: No murmur heard.   No friction rub.   Pulmonary:      Effort: Pulmonary effort is normal. No respiratory distress.      Breath sounds: Normal breath sounds. No wheezing.   Abdominal:      General: Abdomen is flat. Bowel sounds are normal.      Palpations: Abdomen is soft.      Tenderness: There is no abdominal tenderness. There is no guarding.   Musculoskeletal:         General: No swelling or tenderness. Normal range of motion.      Cervical back: Normal range of motion and neck supple.   Skin:     General: Skin is warm and dry.       Findings: No rash.   Neurological:      General: No focal deficit present.      Mental Status: She is alert and oriented to person, place, and time.      Cranial Nerves: No cranial nerve deficit.      Motor: No weakness.   Psychiatric:         Mood and Affect: Mood normal.         Behavior: Behavior normal.         Fluids  No intake or output data in the 24 hours ending 06/20/21 1222    Laboratory  Recent Labs     06/19/21  1834 06/20/21  0309   WBC 9.3 8.3   RBC 4.22 4.08*   HEMOGLOBIN 13.4 12.8   HEMATOCRIT 40.5 40.5   MCV 96.0 99.3*   MCH 31.8 31.4   MCHC 33.1* 31.6*   RDW 49.1 51.0*   PLATELETCT 311 266   MPV 9.7 9.8     Recent Labs     06/19/21  1834 06/20/21  0309   SODIUM 140 142   POTASSIUM 4.0 4.1   CHLORIDE 107 109   CO2 19* 22   GLUCOSE 113* 108*   BUN 8 6*   CREATININE 0.70 0.71   CALCIUM 8.6 8.2*     Recent Labs     06/19/21  1834   APTT >240.0*   INR 1.14*               Imaging  EC-ECHOCARDIOGRAM COMPLETE W/O CONT   Final Result      OUTSIDE IMAGES-CT CHEST   Final Result      IR-PULMONARY ANGIOGRAM-BILATERAL    (Results Pending)   DX-CHEST-PORTABLE (1 VIEW)    (Results Pending)   US-EXTREMITY VENOUS LOWER BILAT    (Results Pending)        Assessment/Plan  Acute hypoxemic respiratory failure (HCC)  Assessment & Plan  Secondary to pulmonary embolism  Status post EKOS  Patient will need to be restarted on anticoagulation    Depression  Assessment & Plan  Resume home dose of Paxil  No suicidal or homicidal ideation at this time    Pulmonary embolism (HCC)  Assessment & Plan  Transthoracic echocardiogram will be checked, if there is evidence of cor pulmonale patient may benefit from catheter-based intervention.    PAF (paroxysmal atrial fibrillation) (HCC)- (present on admission)  Assessment & Plan  Patient was recently taking Eliquis for anticoagulation along with a baby aspirin and metoprolol.  Will be on a heparin drip for now, may be able to transition back to Eliquis once hemodynamically stabilized  and off oxygen.  Continue home dose beta-blockade.  Continue home dose ASA    HLD (hyperlipidemia)- (present on admission)  Assessment & Plan  Continue to encourage lifestyle modifications to include diet and exercise    Hypothyroidism- (present on admission)  Assessment & Plan  Currently asymptomatic  Continue home dose of levothyroxine       VTE prophylaxis: SCDs, previously on heparin drip

## 2021-06-20 NOTE — PROGRESS NOTES
4 Eyes Skin Assessment Completed by Kasey RN and Cynthia RN.    Head WDL  Ears WDL  Nose WDL  Mouth WDL  Neck WDL  Breast/Chest WDL  Shoulder Blades WDL  Spine WDL  (R) Arm/Elbow/Hand WDL  (L) Arm/Elbow/Hand WDL  Abdomen WDL  Groin WDL  Scrotum/Coccyx/Buttocks WDL  (R) Leg WDL  (L) Leg WDL  (R) Heel/Foot/Toe WDL  (L) Heel/Foot/Toe WDL          Devices In Places Tele Box, PIV, nasal cannula, glasses      Interventions In Place NC W/Ear Foams, patient able to turn self in bed    Possible Skin Injury No    Pictures Uploaded Into Epic N/A  Wound Consult Placed N/A  RN Wound Prevention Protocol Ordered No

## 2021-06-20 NOTE — ED TRIAGE NOTES
.Molly Jesus  .  Chief Complaint   Patient presents with   • Shortness of Breath   • Near Syncopal     x 2 today     Patient BIB [facility] as transfer from [facility] with above complaint and further eval of bilateral PEs. Patient arrived with heparin gtt infusing at 1,000units/hr. aao x 4. ORONA. Patient reports increase SOB with activity.     PIV x 2 placed pta.     ERP to see.

## 2021-06-20 NOTE — PROGRESS NOTES
IR RN note    Reviewed sedation orders with MD  Patient underwent a Bilateral Pulmonary Angiogram with EKOS catheter placement by Dr. Strickland.  Procedure site was verified by MD using imaging guidance. Consent was signed.  IR harrison Medrano and Olya assisted. Patient was placed in a supine position.  Vitals were taken every 5 minutes and remained stable during procedure (see doc flow sheet for results).  CO2 waveform capnography was monitored throughout procedure, see chart.  Right femoral access site.   A gauze and tegaderm dressing was placed over sheath. Report called to Anna Marie GONZALEZ.     After MD completed the procedure, patient BP dropped to the 60s Systolic and was still sustaining HR of 160s. Rapid Response was initiated while the ICU RN was coming down to get the patient. During Rapid patient received medications that improved BP to 90s Systolic and HR converted back to SR 80-90s. Patient was then hooked up to the EKOS machine with marion RN and Azucena ICU RN and transport with Azucena GONZALEZ and genny RNs to [facility] with monitor.

## 2021-06-20 NOTE — ASSESSMENT & PLAN NOTE
Requiring supplemental oxygen.  Secondary to pulmonary embolism  Status post EKOS  Incentive spirometry

## 2021-06-20 NOTE — PROGRESS NOTES
Heparin Xa was drawn at 0309. Lab result still pending. Microbiology called 3 times but no answer. Will try again.

## 2021-06-20 NOTE — ASSESSMENT & PLAN NOTE
Likely result of PE  Troponin 47 repeat  EKG shows T wave abnormalities  CXR pending  Monitor deonna for symptoms

## 2021-06-20 NOTE — PROGRESS NOTES
EKOS rate verifications double checked with CARLOS Beal in IR. Accompanied patient from IR to [id number] with assist of RN x 2 and tech on transport monitor and EKOS. Patient is alert and oriented.  on 5 of Levo. HR 80s. Charge RN Christiano and Pharmacist Hortencia to bedside to confirm double checks of EKOs rates.

## 2021-06-20 NOTE — CARE PLAN
Problem: Knowledge Deficit - Standard  Goal: Patient and family/care givers will demonstrate understanding of plan of care, disease process/condition, diagnostic tests and medications  Outcome: Progressing  Note: Patient and  updated on plan of care. All questions answered.      Problem: Fall Risk  Goal: Patient will remain free from falls  Outcome: Progressing  Note: Fall precautions in place. Patient educated to call prior to getting up.

## 2021-06-20 NOTE — PROGRESS NOTES
RRT in IR    Talking with Dr Strickland in IR patient ST to 180's he gave 5mg IV cardiazem right after catheters placed Patient was hypotensive to SBP 60 I came to bedside. Roland stick 3ml 300mcg IV given. SBP improved patient was mentating fine and talking with non labored respiratory but with complained of chest pain. Norepinephrine gtt started to defend map > 70. On monitor afib with -170's amiodarone 150mg IV over 10 minutes. Dr Strickland will place floyd and start thrombolysis. Bedside echo with RV dilation and mcconnel sign, good function, IVC full with catheter. Skin warm no mottling.     Will see in ICU please call with question RRT nurses at bedside.     Patient remains in critical condition from acute shock and neosynephrine push and stabilization, starting norepinephrine and amiodarone. Critical care time provided was 40 minutes. This excludes all separate billable procedures.       Torres Harris MD  Critical Care Medicine

## 2021-06-21 LAB
ANION GAP SERPL CALC-SCNC: 8 MMOL/L (ref 7–16)
BUN SERPL-MCNC: 5 MG/DL (ref 8–22)
CALCIUM SERPL-MCNC: 7.9 MG/DL (ref 8.5–10.5)
CHLORIDE SERPL-SCNC: 107 MMOL/L (ref 96–112)
CHOLEST SERPL-MCNC: 150 MG/DL (ref 100–199)
CO2 SERPL-SCNC: 25 MMOL/L (ref 20–33)
CREAT SERPL-MCNC: 0.58 MG/DL (ref 0.5–1.4)
ERYTHROCYTE [DISTWIDTH] IN BLOOD BY AUTOMATED COUNT: 49.7 FL (ref 35.9–50)
GLUCOSE SERPL-MCNC: 93 MG/DL (ref 65–99)
HCT VFR BLD AUTO: 35.2 % (ref 37–47)
HDLC SERPL-MCNC: 44 MG/DL
HGB BLD-MCNC: 11.5 G/DL (ref 12–16)
LDLC SERPL CALC-MCNC: 77 MG/DL
MCH RBC QN AUTO: 32.2 PG (ref 27–33)
MCHC RBC AUTO-ENTMCNC: 32.7 G/DL (ref 33.6–35)
MCV RBC AUTO: 98.6 FL (ref 81.4–97.8)
PLATELET # BLD AUTO: 229 K/UL (ref 164–446)
PMV BLD AUTO: 10 FL (ref 9–12.9)
POTASSIUM SERPL-SCNC: 4 MMOL/L (ref 3.6–5.5)
RBC # BLD AUTO: 3.57 M/UL (ref 4.2–5.4)
SODIUM SERPL-SCNC: 140 MMOL/L (ref 135–145)
TRIGL SERPL-MCNC: 144 MG/DL (ref 0–149)
UFH PPP CHRO-ACNC: 0.35 IU/ML
UFH PPP CHRO-ACNC: 0.82 IU/ML
WBC # BLD AUTO: 7.2 K/UL (ref 4.8–10.8)

## 2021-06-21 PROCEDURE — 770020 HCHG ROOM/CARE - TELE (206)

## 2021-06-21 PROCEDURE — 80061 LIPID PANEL: CPT

## 2021-06-21 PROCEDURE — 80048 BASIC METABOLIC PNL TOTAL CA: CPT

## 2021-06-21 PROCEDURE — A9270 NON-COVERED ITEM OR SERVICE: HCPCS | Performed by: INTERNAL MEDICINE

## 2021-06-21 PROCEDURE — 97165 OT EVAL LOW COMPLEX 30 MIN: CPT

## 2021-06-21 PROCEDURE — 97161 PT EVAL LOW COMPLEX 20 MIN: CPT

## 2021-06-21 PROCEDURE — 99233 SBSQ HOSP IP/OBS HIGH 50: CPT | Performed by: HOSPITALIST

## 2021-06-21 PROCEDURE — A9270 NON-COVERED ITEM OR SERVICE: HCPCS | Performed by: HOSPITALIST

## 2021-06-21 PROCEDURE — 700102 HCHG RX REV CODE 250 W/ 637 OVERRIDE(OP): Performed by: HOSPITALIST

## 2021-06-21 PROCEDURE — 700111 HCHG RX REV CODE 636 W/ 250 OVERRIDE (IP): Performed by: RADIOLOGY

## 2021-06-21 PROCEDURE — 700102 HCHG RX REV CODE 250 W/ 637 OVERRIDE(OP): Performed by: INTERNAL MEDICINE

## 2021-06-21 PROCEDURE — 85027 COMPLETE CBC AUTOMATED: CPT

## 2021-06-21 PROCEDURE — 85520 HEPARIN ASSAY: CPT | Mod: 91

## 2021-06-21 RX ADMIN — RIVAROXABAN 15 MG: 15 TABLET, FILM COATED ORAL at 12:58

## 2021-06-21 RX ADMIN — PAROXETINE HYDROCHLORIDE 20 MG: 20 TABLET, FILM COATED ORAL at 08:22

## 2021-06-21 RX ADMIN — MESALAMINE 2400 MG: 400 CAPSULE, DELAYED RELEASE ORAL at 20:05

## 2021-06-21 RX ADMIN — DOCUSATE SODIUM 50 MG AND SENNOSIDES 8.6 MG 2 TABLET: 8.6; 5 TABLET, FILM COATED ORAL at 08:23

## 2021-06-21 RX ADMIN — HEPARIN SODIUM 18 UNITS/KG/HR: 5000 INJECTION, SOLUTION INTRAVENOUS at 01:07

## 2021-06-21 RX ADMIN — LEVOTHYROXINE SODIUM 25 MCG: 0.03 TABLET ORAL at 06:18

## 2021-06-21 RX ADMIN — MESALAMINE 2400 MG: 400 CAPSULE, DELAYED RELEASE ORAL at 08:24

## 2021-06-21 RX ADMIN — RIVAROXABAN 15 MG: 15 TABLET, FILM COATED ORAL at 17:53

## 2021-06-21 RX ADMIN — OMEPRAZOLE 40 MG: 20 CAPSULE, DELAYED RELEASE ORAL at 08:22

## 2021-06-21 ASSESSMENT — GAIT ASSESSMENTS
DEVIATION: BRADYKINETIC
DISTANCE (FEET): 250
GAIT LEVEL OF ASSIST: SUPERVISED

## 2021-06-21 ASSESSMENT — COGNITIVE AND FUNCTIONAL STATUS - GENERAL
MOBILITY SCORE: 24
SUGGESTED CMS G CODE MODIFIER MOBILITY: CH
DRESSING REGULAR LOWER BODY CLOTHING: A LITTLE
HELP NEEDED FOR BATHING: A LITTLE
SUGGESTED CMS G CODE MODIFIER DAILY ACTIVITY: CJ
PERSONAL GROOMING: A LITTLE
DAILY ACTIVITIY SCORE: 21

## 2021-06-21 ASSESSMENT — ENCOUNTER SYMPTOMS
DEPRESSION: 0
ROS GI COMMENTS: TOLERATING A DIET
MYALGIAS: 0
SHORTNESS OF BREATH: 1
BLURRED VISION: 0
HEMOPTYSIS: 0
SHORTNESS OF BREATH: 0
PALPITATIONS: 0
CHILLS: 0
WHEEZING: 0
DIZZINESS: 0
BRUISES/BLEEDS EASILY: 0
EYE PAIN: 0
COUGH: 0
WEAKNESS: 0
FEVER: 0
VOMITING: 0
HEADACHES: 0
ABDOMINAL PAIN: 0

## 2021-06-21 ASSESSMENT — ACTIVITIES OF DAILY LIVING (ADL): TOILETING: INDEPENDENT

## 2021-06-21 ASSESSMENT — PAIN DESCRIPTION - PAIN TYPE
TYPE: ACUTE PAIN

## 2021-06-21 NOTE — CARE PLAN
Problem: Knowledge Deficit - Standard  Goal: Patient and family/care givers will demonstrate understanding of plan of care, disease process/condition, diagnostic tests and medications  Outcome: Progressing  Note: Discussed plan of care with the patient, answered any question she had at the time.      Problem: Fall Risk  Goal: Patient will remain free from falls  Outcome: Progressing  Note: Patient remained free of falls this shift. Safety measures implemented per unit protocol.      Problem: Pain - Standard  Goal: Alleviation of pain or a reduction in pain to the patient’s comfort goal  Outcome: Progressing  Note: Pain managed per unit protocol and MD orders

## 2021-06-21 NOTE — HOSPITAL COURSE
"\"61 y F with Bipolar, GERD, hiatal hernia, chol, UC, recent afib dx and hypothyroidism that works as a . May 30th she was dx of superficial clot and was taken off anticoagulation. She presents from [place] after syncope found to have PE and transferred here. She was evaluated by CCM yesterday. She today went to IR for Ekos catheter based. In ER patient right after catheters placed had SNT to 180s and was given 5mg of IV cardiazem and had shock to SBP 60's. I came emergently to bedside and neosynephrine 3ml or 300mcg IV was given. With improved hemodynamics. Her mentation was good the entire time. She was complaining of chest pain. Bedside echo showed dilated RV with mcconnel sign but function preserved and large IVC without collapse.\"  "

## 2021-06-21 NOTE — CARE PLAN
Problem: Knowledge Deficit - Standard  Goal: Patient and family/care givers will demonstrate understanding of plan of care, disease process/condition, diagnostic tests and medications  6/21/2021 1039 by Azucena Connell R.N.  Outcome: Progressing  6/21/2021 1039 by Azucena Connell R.N.  Outcome: Progressing     Problem: Fall Risk  Goal: Patient will remain free from falls  6/21/2021 1039 by ESEQUIEL ValenzuelaN.  Outcome: Progressing  6/21/2021 1039 by ESEQUIEL ValenzuelaN.  Outcome: Progressing     Problem: Pain - Standard  Goal: Alleviation of pain or a reduction in pain to the patient’s comfort goal  6/21/2021 1039 by Azucena Connell R.N.  Outcome: Progressing  6/21/2021 1039 by ESEQUIEL ValenzuelaN.  Outcome: Progressing     Problem: Optimal Care of the Patient with VTE  Goal: Peripheral tissue perfusion will improve  Outcome: Progressing  Goal: Symptoms of redness, swelling, and pain at the site of impaired tissue perfusion will improve  Outcome: Progressing     Problem: Respiratory  Goal: Patient will achieve/maintain optimum respiratory ventilation and gas exchange  Outcome: Progressing   The patient is Stable - Low risk of patient condition declining or worsening    Shift Goals  Clinical Goals: Keep o2 above 90, monitor for hematomas  Patient Goals: eat and sleep    Progress made toward(s) clinical / shift goals:  Patient Sats > 90 on 3L O2.     Patient is not progressing towards the following goals:  Small hematoma developing on left groin site. MD Grace notified.

## 2021-06-21 NOTE — PROGRESS NOTES
[facility] Medicine Daily Progress Note    Date of Service  6/21/2021    Chief Complaint  61 y.o. female admitted 6/19/2021 with shortness of breath    Hospital Course  Ms. Jesus is a 61 y.o. female with a history of afib that had recently stopped Eliquis on 6/10/21 who presented to [facility] in [place] 6/19/2021 with multiple fainting episodes. A CTA of the chest revealed extensive bilateral pulmonary emboli and she was airlifted to our facility for ongoing care on 3 liters of oxygen.  She was transferred on an IV heparin drip.     Interval Problem Update  6/20/2021: The patient was seen and evaluated at bedside and appears comfortable.  She is admitted for further evaluation and management of bilateral pulmonary embolism.  Patient requiring 3 L via nasal cannula.  Echocardiogram was done and showed some right ventricular strain.  Images reviewed by interventional radiology with current recommendations for EKOS.  She tolerated the procedure well and is currently being observed in the medical ICU.  At the time of my examination, patient denied any chest pains, palpitations, or shortness of breath.  No other overnight events reported.  6/21: Ms. Jesus was evaluated and examined in the ICU. She underwent EKOS yesterday and remains on an IV heparin drip. She is still on 3 liters of oxygen. I discussed the risks of anticoag with patient, her daughter, and  at bedside. She will be on anticoagulation indefinitely. Incentive spirometry ordered.     Consultants/Specialty  Critical care. I discussed with Dr. Grace this morning    Code Status  Full Code    Disposition  tele    Review of Systems  Review of Systems   Constitutional: Negative for chills and fever.   Respiratory: Positive for shortness of breath. Negative for cough and hemoptysis.    Cardiovascular: Positive for chest pain. Negative for leg swelling.   Gastrointestinal:        Tolerating a diet   All other systems reviewed and are  negative.       Physical Exam  Temp:  [36.7 °C (98.1 °F)-36.8 °C (98.2 °F)] 36.8 °C (98.2 °F)  Pulse:  [] 87  Resp:  [9-26] 21  BP: ()/(45-81) 101/67  SpO2:  [86 %-98 %] 94 %    Physical Exam  Vitals and nursing note reviewed.   Constitutional:       Appearance: Normal appearance.   HENT:      Head: Normocephalic and atraumatic.      Mouth/Throat:      Mouth: Mucous membranes are dry.      Pharynx: Oropharynx is clear.   Eyes:      General: No scleral icterus.     Conjunctiva/sclera: Conjunctivae normal.   Cardiovascular:      Rate and Rhythm: Normal rate and regular rhythm.   Pulmonary:      Effort: Pulmonary effort is normal.      Breath sounds: Normal breath sounds.      Comments: 3 liters NC oxygen  Abdominal:      General: There is no distension.      Tenderness: There is no abdominal tenderness.   Musculoskeletal:      Cervical back: Normal range of motion and neck supple.      Right lower leg: No edema.      Left lower leg: No edema.   Skin:     General: Skin is warm and dry.   Neurological:      General: No focal deficit present.      Mental Status: She is alert and oriented to person, place, and time.   Psychiatric:         Mood and Affect: Mood normal.         Behavior: Behavior normal.         Thought Content: Thought content normal.         Judgment: Judgment normal.         Fluids    Intake/Output Summary (Last 24 hours) at 6/21/2021 0801  Last data filed at 6/21/2021 0107  Gross per 24 hour   Intake 1141.17 ml   Output --   Net 1141.17 ml       Laboratory  Recent Labs     06/20/21  1455 06/20/21  2130 06/21/21  0520   WBC 7.8 9.6 7.2   RBC 3.58* 3.77* 3.57*   HEMOGLOBIN 11.4* 12.2 11.5*   HEMATOCRIT 35.4* 37.5 35.2*   MCV 98.9* 99.5* 98.6*   MCH 31.8 32.4 32.2   MCHC 32.2* 32.5* 32.7*   RDW 50.8* 50.6* 49.7   PLATELETCT 215 222 229   MPV 9.5 9.7 10.0     Recent Labs     06/20/21  0309 06/20/21  1155 06/21/21  0520   SODIUM 142 143 140   POTASSIUM 4.1 3.6 4.0   CHLORIDE 109 114* 107   CO2 22  19* 25   GLUCOSE 108* 95 93   BUN 6* 5* 5*   CREATININE 0.71 0.59 0.58   CALCIUM 8.2* 7.4* 7.9*     Recent Labs     06/19/21  1834 06/20/21  1155 06/20/21  1455   APTT >240.0*  --  49.8*   INR 1.14* 1.16*  --          Recent Labs     06/21/21  0520   TRIGLYCERIDE 144   HDL 44   LDL 77       Imaging  DX-CHEST-PORTABLE (1 VIEW)   Final Result      Right and left pulmonary artery EKOS catheters, pulmonary arterial and cardiac silhouette enlargement      US-EXTREMITY VENOUS LOWER BILAT   Final Result      IR-PULMONARY ANGIOGRAM-BILATERAL   Final Result      1.  Ultrasound guided access RIGHT common femoral vein x 2   2.  BILATERAL selective pulmonary arteriogram   3.  Placement of BILATERAL EKOS catheters   4.  Initiation of thrombolytic infusion with EKOS acoustic pulse thrombolysis therapy (US-assisted thrombolysis)               EC-ECHOCARDIOGRAM COMPLETE W/O CONT   Final Result      OUTSIDE IMAGES-CT CHEST   Final Result           Assessment/Plan  * Pulmonary embolism (HCC)  Assessment & Plan  Extensive bilateral pulmonary emboli with echocardiogram evidence of right heart strain RVSP 60 mmHg  Status post EKOS therapy on 6/21 with ICU admission and subsequent IV heparin drip.  DVT studies negative  Change to Xarelto on 6/21    Acute hypoxemic respiratory failure (HCC)  Assessment & Plan  Requiring supplemental oxygen.  Secondary to pulmonary embolism  Status post EKOS  Incentive spirometry     PAF (paroxysmal atrial fibrillation) (HCC)- (present on admission)  Assessment & Plan  Patient was recently taking Eliquis for anticoagulation though had elected to stop it a week ago.    Depression  Assessment & Plan  Resume home dose of Paxil      HLD (hyperlipidemia)- (present on admission)  Assessment & Plan  Continue to encourage lifestyle modifications to include diet and exercise    Hypothyroidism- (present on admission)  Assessment & Plan  Currently asymptomatic  Continue home dose of levothyroxine       VTE  prophylaxis: Xarelto

## 2021-06-21 NOTE — THERAPY
Physical Therapy   Initial Evaluation     Patient Name: Molly Jesus  Age:  61 y.o., Sex:  female  Medical Record #: [id number]  Today's Date: 6/21/2021     Precautions: Fall Risk    Assessment  Patient is 61 y.o. female with a diagnosis of PE and A fib.  Pt s/p EKOS and on heparin infusion at time of PT evaluation. Pt demonstrated functional mobility and gait at Mod I to SPV level. Able to negotiate 2-3 steps with ease. Educated pt on reducing fall risks due to blood thinners, pt receptive. No further acute PT needs at this time.       Plan    Recommend Physical Therapy for Evaluation only     DC Equipment Recommendations: None  Discharge Recommendations: Anticipate that the patient will have no further physical therapy needs after discharge from the hospital       06/21/21 1204   Precautions   Precautions Fall Risk   Comments heparin infusion   Vitals   O2 (LPM) 3   O2 Delivery Device Nasal Cannula   Pain 0 - 10 Group   Therapist Pain Assessment Nurse Notified  (none reported)   Prior Living Situation   Prior Services Home-Independent   Housing / Facility 1 Story House   Steps Into Home 2   Steps In Home   (mutiple sets of 2-3 steps throughout the home)   Rail   (has baby [place] for granddtr which she can use for support)   Equipment Owned None   Lives with - Patient's Self Care Capacity Spouse   Comments Lives in [place], [place]. reports good social support upon DC home   Prior Level of Functional Mobility   Bed Mobility Independent   Transfer Status Independent   Ambulation Independent   Distance Ambulation (Feet)   (community)   Assistive Devices Used None   Stairs Independent   Comments pt works as a  and [place]. Longest drive is coming to [place] Wednesdays for supplies   Cognition    Level of Consciousness Alert   Comments cooperative with PT   Active ROM Lower Body    Active ROM Lower Body  WDL   Strength Lower Body   Lower Body Strength  WDL   Balance Assessment   Sitting Balance  (Static) Good   Sitting Balance (Dynamic) Good   Standing Balance (Static) Fair +   Standing Balance (Dynamic) Fair   Weight Shift Sitting Good   Weight Shift Standing Fair   Comments w/o AD   Gait Analysis   Gait Level Of Assist Supervised   Assistive Device None   Distance (Feet) 250   # of Times Distance was Traveled 1   Deviation Bradykinetic  (high guard)   # of Stairs Climbed 3   Level of Assist with Stairs Supervised   Weight Bearing Status no restrictions   Comments slow but overall steady.    Bed Mobility    Supine to Sit Modified Independent   Sit to Supine   (seated in chair at end of session)   Scooting Modified Independent   Functional Mobility   Sit to Stand Supervised   Bed, Chair, Wheelchair Transfer Supervised   Transfer Method Stand Step

## 2021-06-21 NOTE — THERAPY
Occupational Therapy   Initial Evaluation     Patient Name: Molly Jesus  Age:  61 y.o., Sex:  female  Medical Record #: [id number]  Today's Date: 6/21/2021     Precautions  Precautions: Fall Risk  Comments: heparin    Assessment  Patient is 61 y.o. female admitted for SOB and multiple sycopal episodes found to have B PE s/p B EKOS tPA. PMHx of bipolar disorder. Completed ADLs/txfs with Elodia-SPV and functional ambulation w/o AD use of walls furniture for balance.Reports  can assist when not at work, and dtr lives in [place] next door and can assist PRN 24/7. Currently limited by decreased functional mobility, activity tolerance, cognition, and balance which are currently affecting pt's ability to complete ADLs/IADLs at baseline. Will continue to follow.      Plan    Recommend Occupational Therapy 2 times per week until therapy goals are met for the following treatments:  Adaptive Equipment, Neuro Re-Education / Balance, Self Care/Activities of Daily Living, Therapeutic Activities and Therapeutic Exercises.    DC Equipment Recommendations: Tub / Shower Seat, Grab Bar(s) in Tub / Shower  Discharge Recommendations: Recommend home health for continued occupational therapy services (as long as family can assist PRN)     Objective     06/21/21 0843   Prior Living Situation   Prior Services Home-Independent   Housing / Facility 1 Story House   Steps Into Home 2   Steps In Home 6  (split level with sunken living space)   Rail   (reports can lean on baby safety [place] at steps)   Bathroom Set up Walk In Shower   Equipment Owned None   Lives with - Patient's Self Care Capacity Spouse   Comments Reports lives with spouse who works during the day, but has dtr who lives in the trailer park next door and can assist pt PRN as is on disability and home 24/7   Prior Level of ADL Function   Self Feeding Independent   Grooming / Hygiene Independent   Bathing Independent   Dressing Independent   Toileting Independent    Prior Level of IADL Function   Medication Management Independent   Laundry Independent   Kitchen Mobility Independent   Finances Independent   Home Management Independent   Shopping Independent   Prior Level Of Mobility Independent Without Device in Community;Independent Without Device in Home   History of Falls   Date of Last Fall   (admitted with syncope/multiple falls)   Precautions   Precautions Fall Risk   Comments heparin   Vitals   O2 (LPM) 2.5   O2 Delivery Device Nasal Cannula   Vitals Comments SpO2 >90% throughout session   Pain 0 - 10 Group   Therapist Pain Assessment Post Activity Pain Same as Prior to Activity;During Activity;Nurse Notified;0   Cognition    Cognition / Consciousness X   Level of Consciousness Alert   Safety Awareness Impaired   New Learning Impaired   Comments pleasant and cooperaitive; questionable retention of edu   Passive ROM Upper Body   Passive ROM Upper Body WDL   Active ROM Upper Body   Active ROM Upper Body  WDL   Strength Upper Body   Upper Body Strength  WDL   Comments functional for ADLs   Balance Assessment   Sitting Balance (Static) Good   Sitting Balance (Dynamic) Fair +   Standing Balance (Static) Fair   Standing Balance (Dynamic) Fair -   Weight Shift Sitting Good   Weight Shift Standing Fair   Comments w/o AD; use of furniture and walls for balance   Bed Mobility    Scooting Supervised   Comments found and left in chair   ADL Assessment   Eating Supervision   Grooming   (declined need)   Lower Body Dressing Supervision   Toileting Supervision   Comments edu on energy conservation techniques as fatigues quickly   How much help from another person does the patient currently need...   Putting on and taking off regular lower body clothing? 3   Bathing (including washing, rinsing, and drying)? 3   Toileting, which includes using a toilet, bedpan, or urinal? 4   Putting on and taking off regular upper body clothing? 4   Taking care of personal grooming such as brushing  teeth? 3   Eating meals? 4   6 Clicks Daily Activity Score 21   Functional Mobility   Sit to Stand Supervised   Bed, Chair, Wheelchair Transfer Supervised   Toilet Transfers Supervised   Transfer Method Stand Step   Mobility w/o AD; but use of walls/furniture or balance   ICU Target Mobility Level   ICU Mobility - Targeted Level Level 4   Edema / Skin Assessment   Edema / Skin  Not Assessed   Activity Tolerance   Comments fatigues quickly in standing; edu on energy conservation   Patient / Family Goals   Patient / Family Goal #1 to go home   Short Term Goals   Short Term Goal # 1 verbalize/demo energy conservation techniques w/o v/cs for ADLs during session   Short Term Goal # 2 standing g/h for >2 min w/o seated RB   Education Group   Education Provided Role of Occupational Therapist;Energy Conservation;Transfers;Home Safety;Activities of Daily Living   Role of Occupational Therapist Patient Response Patient;Acceptance;Explanation;Verbal Demonstration;Reinforcement Needed   Energy Conservation Patient Response Patient;Acceptance;Explanation;Handout;Reinforcement Needed;No Learning Evidence   Home Safety Patient Response Patient;Acceptance;Explanation;Reinforcement Needed;No Learning Evidence   Transfers Patient Response Patient;Acceptance;Explanation;Verbal Demonstration;Reinforcement Needed   ADL Patient Response Patient;Acceptance;Explanation;Verbal Demonstration;Reinforcement Needed

## 2021-06-21 NOTE — PROGRESS NOTES
Patient BP stable titrating down Levo. Levo gtt turned off at 1300. -120. Patient tolerating well.

## 2021-06-21 NOTE — PROGRESS NOTES
Radiology Progress Note   Author: JONO Severino Date & Time created: 6/21/2021  12:14 PM   Date of admission  6/19/2021    Note to reader: this note follows the APSO format rather than the historical SOAP format. Assessment and plan located at the top of the note for ease of use.    Chief Complaint  61 y.o. female admitted 6/19/2021 with SOB    Assessment/Plan  Interval History   Principal Problem:    Pulmonary embolism (HCC) POA: Unknown  Active Problems:    Hypothyroidism POA: Yes    HLD (hyperlipidemia) POA: Yes    PAF (paroxysmal atrial fibrillation) (HCC) POA: Yes    Depression POA: Unknown    Acute hypoxemic respiratory failure (HCC) POA: Unknown    Chest pain POA: Unknown  Resolved Problems:    * No resolved hospital problems. *     Plan    IR- Right groin surgical site CDI. No redness or swelling. Patient sitting at bedside table on 3 L O2 with no complains of SOB or chest pain.      Thank you for allowing Interventional Radiology team to participate in the patients care, if any additonal care or requests are needed in the future please do not hesitate call or place IR order      [id number]  IR:   6/20- Bilateral Pulmonary Angiogram with EKOS catheter placement, Sheaths removed post 4 hr gtt.                     Review of Systems  Physical Exam   Review of Systems   Constitutional: Negative for chills and fever.   HENT: Negative for hearing loss.    Eyes: Negative for blurred vision and pain.   Respiratory: Negative for cough, hemoptysis, shortness of breath and wheezing.    Cardiovascular: Negative for chest pain and palpitations.   Gastrointestinal: Negative for abdominal pain and vomiting.   Genitourinary: Negative for dysuria.   Musculoskeletal: Negative for myalgias.   Skin: Negative for rash.   Neurological: Negative for dizziness, weakness and headaches.   Endo/Heme/Allergies: Does not bruise/bleed easily.   Psychiatric/Behavioral: Negative for depression.      Vitals:    06/21/21 1015   BP:  112/72   Pulse: 78   Resp:    Temp:    SpO2: 93%        Physical Exam  Constitutional:       Appearance: Normal appearance.   HENT:      Head: Normocephalic.      Nose: Nose normal.      Mouth/Throat:      Mouth: Mucous membranes are moist.   Eyes:      Pupils: Pupils are equal, round, and reactive to light.   Cardiovascular:      Rate and Rhythm: Normal rate.   Pulmonary:      Effort: Pulmonary effort is normal. No respiratory distress.      Breath sounds: Normal breath sounds. No wheezing or rales.   Chest:      Chest wall: No tenderness.   Abdominal:      General: Abdomen is flat.      Tenderness: There is no abdominal tenderness.   Musculoskeletal:         General: No tenderness or deformity.      Cervical back: Normal range of motion.   Skin:     General: Skin is warm and dry.      Capillary Refill: Capillary refill takes 2 to 3 seconds.      Coloration: Skin is not jaundiced or pale.   Neurological:      General: No focal deficit present.      Mental Status: She is alert.      Motor: No weakness.   Psychiatric:         Mood and Affect: Mood normal.         Behavior: Behavior normal.             Labs    Recent Labs     06/20/21  1455 06/20/21  2130 06/21/21  0520   WBC 7.8 9.6 7.2   RBC 3.58* 3.77* 3.57*   HEMOGLOBIN 11.4* 12.2 11.5*   HEMATOCRIT 35.4* 37.5 35.2*   MCV 98.9* 99.5* 98.6*   MCH 31.8 32.4 32.2   MCHC 32.2* 32.5* 32.7*   RDW 50.8* 50.6* 49.7   PLATELETCT 215 222 229   MPV 9.5 9.7 10.0     Recent Labs     06/20/21  0309 06/20/21  1155 06/21/21  0520   SODIUM 142 143 140   POTASSIUM 4.1 3.6 4.0   CHLORIDE 109 114* 107   CO2 22 19* 25   GLUCOSE 108* 95 93   BUN 6* 5* 5*   CREATININE 0.71 0.59 0.58   CALCIUM 8.2* 7.4* 7.9*         Recent Labs     06/20/21  0309 06/20/21  1155 06/21/21  0520   SODIUM 142 143 140   POTASSIUM 4.1 3.6 4.0   CHLORIDE 109 114* 107   CO2 22 19* 25   GLUCOSE 108* 95 93   BUN 6* 5* 5*     INR   Date Value Ref Range Status   06/20/2021 1.16 (H) 0.87 - 1.13 Final     Comment:      INR - Non-therapeutic Reference Range: 0.87-1.13  INR - Therapeutic Reference Range: 2.0-4.0       No results found for: POCINR      Labs not explicitly included in this progress note were reviewed by the author.   Radiology/imaging not explicitly included in this progress note was reviewed by the   author.       I have performed a physical exam and reviewed and updated ROS and Plan today (6/21/2021). In review of yesterday's note (6/20/2021), there are no changes except as documented above.     25 minutes in directly providing and coordinating care and extensive data review.  No time overlap and excludes procedures.

## 2021-06-21 NOTE — PROGRESS NOTES
During 2000 assessment, noted that L fem site (from A-line) was starting to form a hematoma. A-line removed at 1900. MD Jaime notified at 2017. Order to place pressure and monitor. 5lb sand bag applied after 5 min manual pressure held. Will check every 15 minutes to monitor site.     2300: Site has been monitored as ordered. Hematoma has gotten smaller and is looking better at this time.     0100: took sand bag off, hematoma resolved. Will continue to monitor.

## 2021-06-21 NOTE — PROGRESS NOTES
Right femoral sheath pulled per MD order after 4 hours of Ekos infusion. Pressure held and dressing applied. Patient tolerated well. Labs drawn and Heparin gtt initiated per order.

## 2021-06-22 VITALS
WEIGHT: 166.23 LBS | HEART RATE: 87 BPM | HEIGHT: 63 IN | DIASTOLIC BLOOD PRESSURE: 74 MMHG | BODY MASS INDEX: 29.45 KG/M2 | RESPIRATION RATE: 20 BRPM | OXYGEN SATURATION: 98 % | SYSTOLIC BLOOD PRESSURE: 114 MMHG | TEMPERATURE: 98.6 F

## 2021-06-22 PROCEDURE — 700102 HCHG RX REV CODE 250 W/ 637 OVERRIDE(OP): Performed by: INTERNAL MEDICINE

## 2021-06-22 PROCEDURE — A9270 NON-COVERED ITEM OR SERVICE: HCPCS | Performed by: INTERNAL MEDICINE

## 2021-06-22 PROCEDURE — 700102 HCHG RX REV CODE 250 W/ 637 OVERRIDE(OP): Performed by: HOSPITALIST

## 2021-06-22 PROCEDURE — 99239 HOSP IP/OBS DSCHRG MGMT >30: CPT | Performed by: HOSPITALIST

## 2021-06-22 PROCEDURE — A9270 NON-COVERED ITEM OR SERVICE: HCPCS | Performed by: HOSPITALIST

## 2021-06-22 RX ADMIN — RIVAROXABAN 15 MG: 15 TABLET, FILM COATED ORAL at 17:29

## 2021-06-22 RX ADMIN — OMEPRAZOLE 40 MG: 20 CAPSULE, DELAYED RELEASE ORAL at 04:55

## 2021-06-22 RX ADMIN — PAROXETINE HYDROCHLORIDE 20 MG: 20 TABLET, FILM COATED ORAL at 04:55

## 2021-06-22 RX ADMIN — MESALAMINE 2400 MG: 400 CAPSULE, DELAYED RELEASE ORAL at 07:52

## 2021-06-22 RX ADMIN — LEVOTHYROXINE SODIUM 25 MCG: 0.03 TABLET ORAL at 04:55

## 2021-06-22 RX ADMIN — RIVAROXABAN 15 MG: 15 TABLET, FILM COATED ORAL at 07:52

## 2021-06-22 NOTE — DISCHARGE SUMMARY
Discharge Summary    CHIEF COMPLAINT ON ADMISSION  Chief Complaint   Patient presents with   • Shortness of Breath   • Near Syncopal     x 2 today       Reason for Admission  Shortness of breath, identified to have bilateral pulmonary emboli    Admission Date  6/19/2021    CODE STATUS  Full Code    HPI & HOSPITAL COURSE  This is a 61 y.o. female here with a history of paroxysmal atrial fibrillation, recent lower extremity injury with superficial thrombosis apparently evaluated, placed on anticoagulation briefly but then discontinued.  The patient presents back to [facility] in [place] with multiple fainting episodes and dyspnea.  The patient at time identified to have extensive bilateral pulmonary emboli, placed on oxygen and was transferred with IV heparin drip to [facility].  Patient was admitted, was evaluated for appropriate treatment and underwent EKOS treatment with success.  The patient was transitioned to oral anticoagulation, she tolerated this well so far, she had improvement in her dyspnea, she still requires oxygen at this time.  Laboratory data reveals a mild normocytic anemia, a follow-up lower extremity ultrasound duplex shows no clot at this time, a initial echocardiogram shows mild to moderately depressed RV systolic function, normal LV systolic function and ejection fraction of 60%.  RVSP at the time was 60 mmHg.  The patient overall did well on anticoagulation, she is currently in process of getting weaned off oxygen but still needs oxygen with ambulation and rest.  The patient has been written back on her Eliquis at this time with loading dose.  Oxygen has been ordered and will be delivered to the hospital for discharge to [place].  The patient has had no other difficulties, no bleeding complication, and is therefore being discharged in a medically improved and stabilized condition with family to home and close outpatient follow-up.  The patient is instructed to obtain a  anticoagulation alert bracelet or necklace.  She is to refrain from activity that could expose her to increased risk of injury.  The patient is instructed not to use NSAIDs or aspirin or other medication that could add to blood thinning at this time.  In light of the patient's family history, history of paroxysmal atrial fibrillation, this significant VTE/pulmonary embolus event is recommended to stay on full dose anticoagulation likely for life.    Therefore, she is discharged in fair and stable condition to home with close outpatient follow-up.    The patient met 2-midnight criteria for an inpatient stay at the time of discharge.    Discharge Date  6/22/2021    FOLLOW UP ITEMS POST DISCHARGE  Ongoing surveillance of anticoagulation, monitoring for medication tolerance    DISCHARGE DIAGNOSES  Principal Problem:    Pulmonary embolism (HCC) POA: Unknown  Active Problems:    Hypothyroidism POA: Yes    HLD (hyperlipidemia) POA: Yes    PAF (paroxysmal atrial fibrillation) (HCC) POA: Yes    Depression POA: Unknown    Acute hypoxemic respiratory failure (HCC) POA: Unknown    Chest pain POA: Unknown  Resolved Problems:    * No resolved hospital problems. *      FOLLOW UP    Asia Aquino, P.A.  [address] [place] [id number]  [phone]            MEDICATIONS ON DISCHARGE     Medication List      START taking these medications      Instructions   * apixaban 5mg Tabs  Commonly known as: ELIQUIS   Take 2 Tablets by mouth 2 times a day for 7 days.  Dose: 10 mg     * apixaban 5mg Tabs  Commonly known as: ELIQUIS   Take 1 tablet by mouth 2 times a day.  Dose: 5 mg         * This list has 2 medication(s) that are the same as other medications prescribed for you. Read the directions carefully, and ask your doctor or other care provider to review them with you.            CONTINUE taking these medications      Instructions   levothyroxine 25 MCG Tabs  Commonly known as: SYNTHROID   Take 25 mcg by mouth Every morning on  "an empty stomach.  Dose: 25 mcg     Lialda 1.2 GM Tbec  Generic drug: mesalamine   Take 2.4 g by mouth 2 times a day.  Dose: 2.4 g     loratadine 10 MG Tabs  Commonly known as: CLARITIN   Take 10 mg by mouth 2 times a day.  Dose: 10 mg     metoprolol SR 50 MG Tb24  Commonly known as: TOPROL XL   Take 50 mg by mouth every day.  Dose: 50 mg     montelukast 10 MG Tabs  Commonly known as: SINGULAIR   Take 10 mg by mouth every day.  Dose: 10 mg     omeprazole 40 MG delayed-release capsule  Commonly known as: PRILOSEC   Take 40 mg by mouth every day.  Dose: 40 mg     PARoxetine 20 MG Tabs  Commonly known as: PAXIL   Take 20 mg by mouth every day.  Dose: 20 mg     propafenone 300 MG tablet  Commonly known as: RYTHMOL   Take 1 Tab by mouth 1 time daily as needed (prolonged palpitations from atrial fibrillation).  Dose: 300 mg        STOP taking these medications    aspirin 81 MG Chew chewable tablet  Commonly known as: ASA            Allergies  Allergies   Allergen Reactions   • Penicillins Hives   • Levaquin [Levofloxacin] Myalgia     Knee pain   • Morphine Vomiting     Severe vomiting   • Other Food Unspecified     Patient states \"gluten contributes to ulcerative colitis flair ups\"   • Milk Products Food Allergy Unspecified     Patient reports intolerance to milk and dairy due to gas pain        DIET  Orders Placed This Encounter   Procedures   • Diet Order Diet: Cardiac; Miscellaneous modifications: (optional): Gluten Free, Lactose Free     Standing Status:   Standing     Number of Occurrences:   1     Order Specific Question:   Diet:     Answer:   Cardiac [6]     Order Specific Question:   Miscellaneous modifications: (optional)     Answer:   Gluten Free [9]     Order Specific Question:   Miscellaneous modifications: (optional)     Answer:   Lactose Free [5]       ACTIVITY  As tolerated.  Weight bearing as tolerated    CONSULTATIONS  Critical care    PROCEDURES  EKOS, performed 6/20/2021    LABORATORY  Lab Results "   Component Value Date    SODIUM 140 06/21/2021    POTASSIUM 4.0 06/21/2021    CHLORIDE 107 06/21/2021    CO2 25 06/21/2021    GLUCOSE 93 06/21/2021    BUN 5 (L) 06/21/2021    CREATININE 0.58 06/21/2021        Lab Results   Component Value Date    WBC 7.2 06/21/2021    HEMOGLOBIN 11.5 (L) 06/21/2021    HEMATOCRIT 35.2 (L) 06/21/2021    PLATELETCT 229 06/21/2021        Total time of the discharge process exceeds 45 minutes.

## 2021-06-22 NOTE — DISCHARGE PLANNING
Care Transition Team Discharge Planning    Anticipated Discharge Disposition: d/c home w/ o2 and script    Action: Lsw received call from [facility]. Lorene says pt can have 2 portable tanks delivered from the bunker to bedside. Franciscaroberto will call pt's spouse to verify their home address, as the mailling address is a po box and they need to deliver the concentrator to her home. Lsw indicated if they need Lsw to ask pt for her home address, this can be accomplished.    LSw contacted RT and requested as above. He will take care of it.    Lsw sent text to d/cing MD to advise as above.     Barriers to Discharge: none    Plan: pt to d/c home today

## 2021-06-22 NOTE — CARE PLAN
Problem: Knowledge Deficit - Standard  Goal: Patient and family/care givers will demonstrate understanding of plan of care, disease process/condition, diagnostic tests and medications  Outcome: Progressing     Problem: Fall Risk  Goal: Patient will remain free from falls  Outcome: Progressing     Problem: Pain - Standard  Goal: Alleviation of pain or a reduction in pain to the patient’s comfort goal  Outcome: Progressing     Problem: Optimal Care of the Patient with VTE  Goal: Peripheral tissue perfusion will improve  Outcome: Progressing  Goal: Complications related to the disease process, condition or treatment will be avoided or minimized  Outcome: Progressing  Goal: Symptoms of redness, swelling, and pain at the site of impaired tissue perfusion will improve  Outcome: Progressing     Problem: Respiratory  Goal: Patient will achieve/maintain optimum respiratory ventilation and gas exchange  Outcome: Progressing     Problem: Knowledge Deficit - VTE  Goal: Patient and family/care givers will demonstrate understanding of plan of care, disease process/condition, diagnostic tests and medications  Outcome: Progressing     Problem: Discharge Barriers/Planning  Goal: Patient's continuum of care needs are met  Outcome: Progressing

## 2021-06-22 NOTE — DISCHARGE PLANNING
Care Transition Team Discharge Planning    Anticipated Discharge Disposition: d/c home w/ meds and o2    Action: Jesus Albertow spoke to [facility] in [place] [phone]. Pt will need prior auth for xarelto and [place] [employer]. They have no number to call for Auth on the paperwork.    Jesus Albertow sent text to MD to update as above. He will write a script for eloquis as pt has that med at home.    Jesus Albertow and [facility] went through several DME companies to attempt to see if they will cover the [place] area and accept [place] [employer].    Fourth contact is w/ [facility] office in [place] will go to [place], and accepts [place] ins.    Jesus Albertow sent completed choice for only provider available. [facility] is faxing referral now.    Jesus Albertow updated MD accordingly.     Barriers to Discharge: See above    Plan: Lsw will continue to follow, and assist w/ d/c planning.

## 2021-06-22 NOTE — DISCHARGE PLANNING
Care Transition Team Discharge Planning    Anticipated Discharge Disposition: d/c home to [place] w/ med Xarelto and possibly O2    Action: Lsw spoke to MD and pt may need O2 at d/c. Also, her pharmacy is [facility] in [place]. The script for Xarelto has been escribed. Pt has eloquis at home too. Pt carries [place].    Lsw called [facility] in [place] @ [phone]. Pharmacy hours indicate they are closed. Lsw left VM.    Barriers to Discharge: TBD    Plan: Lsw will continue to follow, and assist w/ d/c planning.

## 2021-06-22 NOTE — DISCHARGE PLANNING
Received Choice form at 1400  Agency/Facility Name: [facility] ([place])  Referral sent per Choice form at 1403

## 2021-06-22 NOTE — FACE TO FACE
"Face to Face Note  -  Durable Medical Equipment    Tulio Melo M.D. - NPI: [id number]  I certify that this patient is under my care and that they had a durable medical equipment(DME)face to face encounter by myself that meets the physician DME face-to-face encounter requirements with this patient on:    Date of encounter:   Patient:                    MRN:                       YOB: 2022  Molly Jesus  [id number]  1960     The encounter with the patient was in whole, or in part, for the following medical condition, which is the primary reason for durable medical equipment:  Other - Pulmonary embolism, hypoxic respiratory failure    I certify that, based on my findings, the following durable medical equipment is medically necessary:  Oxygen.    HOME O2 Saturation Measurements:(Values must be present for Home Oxygen orders)  Room air sat at rest: 92  Room air sat with amb: 75  With liters of O2: 3, O2 sat at rest with O2: 97  With Liters of O2: 4, O2 sat with amb with O2 : 93  Is the patient mobile?: Yes    My Clinical findings support the need for the above equipment due to:  Hypoxia, acute respiratory failure    Supporting Symptoms: The patient requires supplemental oxygen, as the following interventions have been tried with limited or no improvement: \"Ambulation with oximetry    If patient feels more short of breath, they can go up to 6 liters per minute and contact healthcare provider.  "

## 2021-06-22 NOTE — PROGRESS NOTES
Pulmonary/Critical Care Medicine   Progress Note    Date of service: 6/21/2021  Time: 7:12    Received EKOS yesterday, hemodynamic stable on 3 L of oxygen.  Remains on heparin infusion, will plan to transition to OAC. Patient stable to be transferred out of ICU today to [facility].  I have discussed this case with hospitalist Dr. Montanez, he will assume care at this time. [facility] will sign off. Please call with any questions.    Evan Grace Jr., D.O.  [facility]

## 2021-06-23 NOTE — PROGRESS NOTES
Discharge instructions reviewed with patient and . All questions answered at that time. Patient educated on discharge medication regiment.  Patient discharged via wheelchair with CNA escort at 1820. All belongings taken with patient.

## 2021-06-23 NOTE — DISCHARGE INSTRUCTIONS
Discharge Instructions    Discharged to home by car with relative. Discharged via wheelchair, hospital escort: Yes.  Special equipment needed: Oxygen    Be sure to schedule a follow-up appointment with your primary care doctor or any specialists as instructed.     Discharge Plan:        I understand that a diet low in cholesterol, fat, and sodium is recommended for good health. Unless I have been given specific instructions below for another diet, I accept this instruction as my diet prescription.   Other diet: Cardiac diet    Special Instructions:    · Is patient discharged on Warfarin / Coumadin?   No     Depression / Suicide Risk    As you are discharged from this [facility] facility, it is important to learn how to keep safe from harming yourself.    Recognize the warning signs:  · Abrupt changes in personality, positive or negative- including increase in energy   · Giving away possessions  · Change in eating patterns- significant weight changes-  positive or negative  · Change in sleeping patterns- unable to sleep or sleeping all the time   · Unwillingness or inability to communicate  · Depression  · Unusual sadness, discouragement and loneliness  · Talk of wanting to die  · Neglect of personal appearance   · Rebelliousness- reckless behavior  · Withdrawal from people/activities they love  · Confusion- inability to concentrate     If you or a loved one observes any of these behaviors or has concerns about self-harm, here's what you can do:  · Talk about it- your feelings and reasons for harming yourself  · Remove any means that you might use to hurt yourself (examples: pills, rope, extension cords, firearm)  · Get professional help from the community (Mental Health, Substance Abuse, psychological counseling)  · Do not be alone:Call your Safe Contact- someone whom you trust who will be there for you.  · Call your local CRISIS HOTLINE 518-4848 or 724-912-7552  · Call your local Children's Mobile Crisis  Response Team [facility] (053) 655-3583 or www.COM DEV  · Call the toll free National Suicide Prevention Hotlines   · National Suicide Prevention Lifeline 112-320-VRZI (0247)  · National Hope Line Network 800-SUICIDE (401-8585)      Pulmonary Embolism    A pulmonary embolism (PE) is a sudden blockage or decrease of blood flow in one or both lungs. Most blockages come from a blood clot that forms in the vein of a lower leg, thigh, or arm (deep vein thrombosis, DVT) and travels to the lungs. A clot is blood that has thickened into a gel or solid. PE is a dangerous and life-threatening condition that needs to be treated right away.  What are the causes?  This condition is usually caused by a blood clot that forms in a vein and moves to the lungs. In rare cases, it may be caused by air, fat, part of a tumor, or other tissue that moves through the veins and into the lungs.  What increases the risk?  The following factors may make you more likely to develop this condition:  · Experiencing a traumatic injury, such as breaking a hip or leg.  · Having:  ? A spinal cord injury.  ? Orthopedic surgery, especially hip or knee replacement.  ? Any major surgery.  ? A stroke.  ? DVT.  ? Blood clots or blood clotting disease.  ? Long-term (chronic) lung or heart disease.  ? Cancer treated with chemotherapy.  ? A central venous catheter.  · Taking medicines that contain estrogen. These include birth control pills and hormone replacement therapy.  · Being:  ? Pregnant.  ? In the period of time after your baby is delivered (postpartum).  ? Older than age 60.  ? Overweight.  ? A smoker, especially if you have other risks.  What are the signs or symptoms?  Symptoms of this condition usually start suddenly and include:  · Shortness of breath during activity or at rest.  · Coughing, coughing up blood, or coughing up blood-tinged mucus.  · Chest pain that is often worse with deep breaths.  · Rapid or irregular  heartbeat.  · Feeling light-headed or dizzy.  · Fainting.  · Feeling anxious.  · Fever.  · Sweating.  · Pain and swelling in a leg. This is a symptom of DVT, which can lead to PE.  How is this diagnosed?  This condition may be diagnosed based on:  · Your medical history.  · A physical exam.  · Blood tests.  · CT pulmonary angiogram. This test checks blood flow in and around your lungs.  · Ventilation-perfusion scan, also called a lung VQ scan. This test measures air flow and blood flow to the lungs.  · An ultrasound of the legs.  How is this treated?  Treatment for this condition depends on many factors, such as the cause of your PE, your risk for bleeding or developing more clots, and other medical conditions you have. Treatment aims to remove, dissolve, or stop blood clots from forming or growing larger. Treatment may include:  · Medicines, such as:  ? Blood thinning medicines (anticoagulants) to stop clots from forming.  ? Medicines that dissolve clots (thrombolytics).  · Procedures, such as:  ? Using a flexible tube to remove a blood clot (embolectomy) or to deliver medicine to destroy it (catheter-directed thrombolysis).  ? Inserting a filter into a large vein that carries blood to the heart (inferior vena cava). This filter (vena cava filter) catches blood clots before they reach the lungs.  ? Surgery to remove the clot (surgical embolectomy). This is rare.  You may need a combination of immediate, long-term (up to 3 months after diagnosis), and extended (more than 3 months after diagnosis) treatments. Your treatment may continue for several months (maintenance therapy). You and your health care provider will work together to choose the treatment program that is best for you.  Follow these instructions at home:  Medicines  · Take over-the-counter and prescription medicines only as told by your health care provider.  · If you are taking an anticoagulant medicine:  ? Take the medicine every day at the same time  each day.  ? Understand what foods and drugs interact with your medicine.  ? Understand the side effects of this medicine, including excessive bruising or bleeding. Ask your health care provider or pharmacist about other side effects.  General instructions  · Wear a medical alert bracelet or carry a medical alert card that says you have had a PE and lists what medicines you take.  · Ask your health care provider when you may return to your normal activities. Avoid sitting or lying for a long time without moving.  · Maintain a healthy weight. Ask your health care provider what weight is healthy for you.  · Do not use any products that contain nicotine or tobacco, such as cigarettes, e-cigarettes, and chewing tobacco. If you need help quitting, ask your health care provider.  · Talk with your health care provider about any travel plans. It is important to make sure that you are still able to take your medicine while on trips.  · Keep all follow-up visits as told by your health care provider. This is important.  Contact a health care provider if:  · You missed a dose of your blood thinner medicine.  Get help right away if:  · You have:  ? New or increased pain, swelling, warmth, or redness in an arm or leg.  ? Numbness or tingling in an arm or leg.  ? Shortness of breath during activity or at rest.  ? A fever.  ? Chest pain.  ? A rapid or irregular heartbeat.  ? A severe headache.  ? Vision changes.  ? A serious fall or accident, or you hit your head.  ? Stomach (abdominal) pain.  ? Blood in your vomit, stool, or urine.  ? A cut that will not stop bleeding.  · You cough up blood.  · You feel light-headed or dizzy.  · You cannot move your arms or legs.  · You are confused or have memory loss.  These symptoms may represent a serious problem that is an emergency. Do not wait to see if the symptoms will go away. Get medical help right away. Call your local emergency services (911 in the [place] Do not drive yourself to the  [facility].  Summary  · A pulmonary embolism (PE) is a sudden blockage or decrease of blood flow in one or both lungs. PE is a dangerous and life-threatening condition that needs to be treated right away.  · Treatments for this condition usually include medicines to thin your blood (anticoagulants) or medicines to break apart blood clots (thrombolytics).  · If you are given blood thinners, it is important to take the medicine every day at the same time each day.  · Understand what foods and drugs interact with any medicines that you are taking.  · If you have signs of PE or DVT, call your local emergency services (911 in the [place]  This information is not intended to replace advice given to you by your health care provider. Make sure you discuss any questions you have with your health care provider.  Document Released: 12/15/2001 Document Revised: 09/25/2019 Document Reviewed: 09/25/2019  Elsevier Patient Education © 2020 [employer]

## 2021-07-09 ENCOUNTER — OFFICE VISIT (OUTPATIENT)
Dept: CARDIOLOGY | Facility: MEDICAL CENTER | Age: 61
End: 2021-07-09
Payer: COMMERCIAL

## 2021-07-09 VITALS
HEIGHT: 63 IN | SYSTOLIC BLOOD PRESSURE: 106 MMHG | HEART RATE: 74 BPM | BODY MASS INDEX: 29.23 KG/M2 | RESPIRATION RATE: 14 BRPM | DIASTOLIC BLOOD PRESSURE: 62 MMHG | OXYGEN SATURATION: 92 % | WEIGHT: 165 LBS

## 2021-07-09 DIAGNOSIS — I48.0 PAF (PAROXYSMAL ATRIAL FIBRILLATION) (HCC): ICD-10-CM

## 2021-07-09 DIAGNOSIS — I26.09 OTHER ACUTE PULMONARY EMBOLISM WITH ACUTE COR PULMONALE (HCC): ICD-10-CM

## 2021-07-09 DIAGNOSIS — R93.1 AGATSTON CAC SCORE, <100: ICD-10-CM

## 2021-07-09 DIAGNOSIS — Z01.810 PREOPERATIVE CARDIOVASCULAR EXAMINATION: ICD-10-CM

## 2021-07-09 PROCEDURE — 99214 OFFICE O/P EST MOD 30 MIN: CPT | Performed by: INTERNAL MEDICINE

## 2021-07-09 ASSESSMENT — FIBROSIS 4 INDEX: FIB4 SCORE: 1.02

## 2021-07-09 NOTE — PROGRESS NOTES
"CARDIOLOGY OUTPATIENT FOLLOWUP    PCP: Asia Aquino, P.A.    1. Other acute pulmonary embolism with acute cor pulmonale (HCC)    2. PAF (paroxysmal atrial fibrillation) ([facility])    3. Agatston CAC score 0.0 on 6/2018    4. Preoperative cardiovascular examination      Molly Jesus is recuperating following recent pulmonary embolism with gradually improving symptoms of forward flow cor pulmonale.  She likely will need at least another month of recuperation before ready [place]  I also advised delaying elective foot surgery at least 6 months.  A. fib is stable and she will continue to use metoprolol as well as as needed Rythmol    Follow up: 6 months -she will complete an echocardiogram before that visit    Chief Complaint   Patient presents with   • Hyperlipidemia   • Atrial Fibrillation     F/V Dx: PAF (paroxysmal atrial fibrillation) ([facility])   • Chest Pain       History: Molly Jesus is a 61 y.o. female with history of paroxysmal atrial fibrillation, dyslipidemia and coronary calcium score presenting for follow-up after recently suffering a pulmonary embolism.  She does describe an episode of thrombophlebitis treated with anticoagulation in the weeks preceding pulmonary embolism.  Last month she was hospitalized at our center for 3 days with bilateral PE and treated with sono thrombolysis given evidence of RV strain.  The RVSP was around 60 and the RV and RA were dilated.  She has been taking Eliquis without side effects and aside from minor bruising has noted no major side effects.  She queries the safety of foot surgery.      ROS:   All other systems reviewed and negative except as per the HPI    PE:  /62 (BP Location: Left arm, Patient Position: Sitting, BP Cuff Size: Adult)   Pulse 74   Resp 14   Ht 1.6 m (5' 3\")   Wt 74.8 kg (165 lb)   SpO2 92%   BMI 29.23 kg/m²   Gen: no acute distress  HEENT: Symmetric face. Anicteric sclerae. Moist mucus " "membranes  NECK: No JVD. No lymphadenopathy  CARDIAC: Regular, Normal S1, S2, No murmur  VASCULATURE: carotids are normal bilaterally without bruit  RESP: Clear to auscultation bilaterally  ABD: Soft, non-tender, non-distended  EXT: No edema, no clubbing or cyanosis  SKIN: Warm and dry  NEURO: No gross deficits  PSYCH: Appropriate affect, participates in conversation    The 10-year ASCVD risk score (Candeaidan LIANG Jr., et al., 2013) is: 2.6%    Past Medical History:   Diagnosis Date   • Bipolar 1 disorder (HCC)    • Carpal tunnel syndrome    • GERD (gastroesophageal reflux disease)    • Hiatal hernia    • HLD (hyperlipidemia) 6/17/2015   • HLD (hyperlipidemia) 6/17/2015   • Hypothyroid    • Osteopenia    • Ulcerative colitis (HCC)      Allergies   Allergen Reactions   • Penicillins Hives   • Levaquin [Levofloxacin] Myalgia     Knee pain   • Morphine Vomiting     Severe vomiting   • Other Food Unspecified     Patient states \"gluten contributes to ulcerative colitis flair ups\"   • Milk Products Food Allergy Unspecified     Patient reports intolerance to milk and dairy due to gas pain      Outpatient Encounter Medications as of 7/9/2021   Medication Sig Dispense Refill   • apixaban (ELIQUIS) 5mg Tab Take 1 tablet by mouth 2 times a day. 60 tablet 11   • loratadine (CLARITIN) 10 MG Tab Take 10 mg by mouth 2 times a day.     • PARoxetine (PAXIL) 20 MG Tab Take 20 mg by mouth every day.     • propafenone (RYTHMOL) 300 MG tablet Take 1 Tab by mouth 1 time daily as needed (prolonged palpitations from atrial fibrillation). 5 Tab 6   • omeprazole (PRILOSEC) 40 MG delayed-release capsule Take 40 mg by mouth every day.     • montelukast (SINGULAIR) 10 MG Tab Take 10 mg by mouth every day.     • metoprolol SR (TOPROL XL) 50 MG TB24 Take 50 mg by mouth every day.     • levothyroxine (SYNTHROID) 25 MCG TABS Take 25 mcg by mouth Every morning on an empty stomach.     • mesalamine (LIALDA) 1.2 GM TBEC Take 2.4 g by mouth 2 times a day.   "     No facility-administered encounter medications on file as of 7/9/2021.     Social History     Socioeconomic History   • Marital status:      Spouse name: Not on file   • Number of children: Not on file   • Years of education: Not on file   • Highest education level: Not on file   Occupational History   • Not on file   Tobacco Use   • Smoking status: Former Smoker   • Smokeless tobacco: Never Used   Vaping Use   • Vaping Use: Never used   Substance and Sexual Activity   • Alcohol use: Yes     Alcohol/week: 1.2 oz     Types: 2 Shots of liquor per week   • Drug use: No   • Sexual activity: Never   Other Topics Concern   • Not on file   Social History Narrative   • Not on file     Social Determinants of Health     Financial Resource Strain:    • Difficulty of Paying Living Expenses:    Food Insecurity:    • Worried About Running Out of Food in the Last Year:    • Ran Out of Food in the Last Year:    Transportation Needs:    • Lack of Transportation (Medical):    • Lack of Transportation (Non-Medical):    Physical Activity:    • Days of Exercise per Week:    • Minutes of Exercise per Session:    Stress:    • Feeling of Stress :    Social Connections:    • Frequency of Communication with Friends and Family:    • Frequency of Social Gatherings with Friends and Family:    • Attends [employer] Services:    • Active Member of Clubs or Organizations:    • Attends Club or Organization Meetings:    • Marital Status:    Intimate Partner Violence:    • Fear of Current or Ex-Partner:    • Emotionally Abused:    • Physically Abused:    • Sexually Abused:        Studies  Lab Results   Component Value Date/Time    CHOLSTRLTOT 150 06/21/2021 05:20 AM    LDL 77 06/21/2021 05:20 AM    HDL 44 06/21/2021 05:20 AM    TRIGLYCERIDE 144 06/21/2021 05:20 AM       Lab Results   Component Value Date/Time    SODIUM 140 06/21/2021 05:20 AM    POTASSIUM 4.0 06/21/2021 05:20 AM    CHLORIDE 107 06/21/2021 05:20 AM    CO2 25 06/21/2021 05:20  AM    GLUCOSE 93 06/21/2021 05:20 AM    BUN 5 (L) 06/21/2021 05:20 AM    CREATININE 0.58 06/21/2021 05:20 AM     Lab Results   Component Value Date/Time    ALKPHOSPHAT 67 06/20/2021 11:55 AM    ASTSGOT 20 06/20/2021 11:55 AM    ALTSGPT 28 06/20/2021 11:55 AM    TBILIRUBIN 0.3 06/20/2021 11:55 AM        For this encounter I reviewed the following medical records Recent H&P or DC summary, BMP, Lipid profile and CBC

## 2021-07-15 ENCOUNTER — TELEPHONE (OUTPATIENT)
Dept: CARDIOLOGY | Facility: MEDICAL CENTER | Age: 61
End: 2021-07-15

## 2021-07-15 NOTE — TELEPHONE ENCOUNTER
BE    Received call from Destiny at pt's PCP [facility] requesting last office visit notes from 7/9. Pt is suppose to be seen in there office today. Destiny states we normally send after each visit but they didn't receive them. Please fax notes to [phone].  Please call if you have any further questions at [phone].     Thank you.

## 2021-12-22 ENCOUNTER — OFFICE VISIT (OUTPATIENT)
Dept: CARDIOLOGY | Facility: MEDICAL CENTER | Age: 61
End: 2021-12-22
Payer: COMMERCIAL

## 2021-12-22 VITALS
SYSTOLIC BLOOD PRESSURE: 100 MMHG | DIASTOLIC BLOOD PRESSURE: 70 MMHG | HEART RATE: 68 BPM | WEIGHT: 169 LBS | OXYGEN SATURATION: 94 % | RESPIRATION RATE: 14 BRPM | BODY MASS INDEX: 29.95 KG/M2 | HEIGHT: 63 IN

## 2021-12-22 DIAGNOSIS — R93.1 AGATSTON CAC SCORE, <100: ICD-10-CM

## 2021-12-22 DIAGNOSIS — I48.0 PAF (PAROXYSMAL ATRIAL FIBRILLATION) (HCC): ICD-10-CM

## 2021-12-22 DIAGNOSIS — I26.09 OTHER ACUTE PULMONARY EMBOLISM WITH ACUTE COR PULMONALE (HCC): ICD-10-CM

## 2021-12-22 DIAGNOSIS — I27.24 CTEPH (CHRONIC THROMBOEMBOLIC PULMONARY HYPERTENSION) (HCC): ICD-10-CM

## 2021-12-22 PROCEDURE — 99214 OFFICE O/P EST MOD 30 MIN: CPT | Performed by: INTERNAL MEDICINE

## 2021-12-22 RX ORDER — PROPAFENONE HYDROCHLORIDE 300 MG/1
300 TABLET, COATED ORAL
Qty: 5 TABLET | Refills: 6
Start: 2021-12-22

## 2021-12-22 RX ORDER — PYRIDOXINE HCL (VITAMIN B6) 100 MG
TABLET ORAL
COMMUNITY

## 2021-12-22 RX ORDER — PANTOPRAZOLE SODIUM 40 MG/1
40 FOR SUSPENSION ORAL DAILY
COMMUNITY
End: 2022-10-14

## 2021-12-22 ASSESSMENT — FIBROSIS 4 INDEX: FIB4 SCORE: 1.02

## 2021-12-22 NOTE — PROGRESS NOTES
"CARDIOLOGY OUTPATIENT FOLLOWUP    PCP: Asia Aquino, P.A.    1. CTEPH (chronic thromboembolic pulmonary hypertension) ([facility])    2. Other acute pulmonary embolism with acute cor pulmonale ([facility])    3. PAF (paroxysmal atrial fibrillation) ([facility])    4. Agatston CAC score 0.0 on 6/2018      Molly Jesus is doing well from a cardiovascular standpoint.  I advised that she update the echocardiogram to see if there is persistent pulmonary hypertension.  I will also arrange a venous insufficiency study and potentially refer on for venous ablation if appropriate.  She will continue to maintain the prescription of Rythmol, utilizing a pill in pocket approach.    Follow up: 1 year    Chief Complaint   Patient presents with   • Atrial Fibrillation     F/V Dx: PAF (paroxysmal atrial fibrillation) ([facility])   • Hyperlipidemia   • Chest Pain       History: Molly Jesus is a 61 y.o. female with history of paroxysmal atrial fibrillation, dyslipidemia and coronary calcium score of 0 presenting for follow-up of pulmonary embolism and CTEPH.  She continues with Eliquis and is resume full activities without cardiovascular symptoms.  She is bothered by lower extremity edema and discomfort.  It is so bad that she wants to cry at the end of the day.  She wears compression garments but finds the result unsatisfactory.  She has not had any A. fib episodes lasting more than a few minutes and has not needed to take the as needed Rythmol that is prescribed.      ROS:   All other systems reviewed and negative except as per the HPI    PE:  /70 (BP Location: Left arm, Patient Position: Sitting, BP Cuff Size: Adult)   Pulse 68   Resp 14   Ht 1.6 m (5' 3\")   Wt 76.7 kg (169 lb)   SpO2 94%   BMI 29.94 kg/m²   Gen: no acute distress  HEENT: Symmetric face. Anicteric sclerae. Moist mucus membranes  NECK: No JVD. No lymphadenopathy  CARDIAC: Regular, Normal S1, S2, No murmur  VASCULATURE: carotids are normal bilaterally " "without bruit  RESP: Clear to auscultation bilaterally  ABD: Soft, non-tender, non-distended  EXT: 2+ lower extremity edema Stasis dermatitis bilaterally, no clubbing or cyanosis  SKIN: Warm and dry  NEURO: No gross deficits  PSYCH: Appropriate affect, participates in conversation    The 10-year ASCVD risk score (Cande LIANG Jr., et al., 2013) is: 2.4%    Past Medical History:   Diagnosis Date   • Bipolar 1 disorder (HCC)    • Carpal tunnel syndrome    • GERD (gastroesophageal reflux disease)    • Hiatal hernia    • HLD (hyperlipidemia) 6/17/2015   • HLD (hyperlipidemia) 6/17/2015   • Hypothyroid    • Osteopenia    • Ulcerative colitis (HCC)      Allergies   Allergen Reactions   • Penicillins Hives   • Levaquin [Levofloxacin] Myalgia     Knee pain   • Morphine Vomiting     Severe vomiting   • Other Food Unspecified     Patient states \"gluten contributes to ulcerative colitis flair ups\"   • Milk Products Food Allergy Unspecified     Patient reports intolerance to milk and dairy due to gas pain      Outpatient Encounter Medications as of 12/22/2021   Medication Sig Dispense Refill   • pantoprazole (PROTONIX) 40 MG Pack Take 40 mg by mouth every day.     • Red Yeast Rice Extract (RED YEAST RICE PO) Take 1,200 mg by mouth.     • Cranberry 500 MG Cap Take  by mouth.     • apixaban (ELIQUIS) 5mg Tab Take 1 tablet by mouth 2 times a day. 60 tablet 11   • loratadine (CLARITIN) 10 MG Tab Take 10 mg by mouth 2 times a day.     • PARoxetine (PAXIL) 20 MG Tab Take 20 mg by mouth every day.     • metoprolol SR (TOPROL XL) 50 MG TB24 Take 50 mg by mouth every day.     • levothyroxine (SYNTHROID) 25 MCG TABS Take 25 mcg by mouth Every morning on an empty stomach.     • mesalamine (LIALDA) 1.2 GM TBEC Take 2.4 g by mouth 2 times a day.     • [DISCONTINUED] propafenone (RYTHMOL) 300 MG tablet Take 1 Tab by mouth 1 time daily as needed (prolonged palpitations from atrial fibrillation). (Patient not taking: Reported on 12/22/2021) 5 Tab 6 "   • [DISCONTINUED] omeprazole (PRILOSEC) 40 MG delayed-release capsule Take 40 mg by mouth every day. (Patient not taking: Reported on 12/22/2021)     • [DISCONTINUED] montelukast (SINGULAIR) 10 MG Tab Take 10 mg by mouth every day. (Patient not taking: Reported on 12/22/2021)       No facility-administered encounter medications on file as of 12/22/2021.     Social History     Socioeconomic History   • Marital status:      Spouse name: Not on file   • Number of children: Not on file   • Years of education: Not on file   • Highest education level: Not on file   Occupational History   • Not on file   Tobacco Use   • Smoking status: Former Smoker   • Smokeless tobacco: Never Used   Vaping Use   • Vaping Use: Never used   Substance and Sexual Activity   • Alcohol use: Yes     Alcohol/week: 1.2 oz     Types: 2 Shots of liquor per week   • Drug use: No   • Sexual activity: Never   Other Topics Concern   • Not on file   Social History Narrative   • Not on file     Social Determinants of Health     Financial Resource Strain:    • Difficulty of Paying Living Expenses: Not on file   Food Insecurity:    • Worried About Running Out of Food in the Last Year: Not on file   • Ran Out of Food in the Last Year: Not on file   Transportation Needs:    • Lack of Transportation (Medical): Not on file   • Lack of Transportation (Non-Medical): Not on file   Physical Activity:    • Days of Exercise per Week: Not on file   • Minutes of Exercise per Session: Not on file   Stress:    • Feeling of Stress : Not on file   Social Connections:    • Frequency of Communication with Friends and Family: Not on file   • Frequency of Social Gatherings with Friends and Family: Not on file   • Attends Rastafarian Services: Not on file   • Active Member of Clubs or Organizations: Not on file   • Attends Club or Organization Meetings: Not on file   • Marital Status: Not on file   Intimate Partner Violence:    • Fear of Current or Ex-Partner: Not on  file   • Emotionally Abused: Not on file   • Physically Abused: Not on file   • Sexually Abused: Not on file   Housing Stability:    • Unable to Pay for Housing in the Last Year: Not on file   • Number of Places Lived in the Last Year: Not on file   • Unstable Housing in the Last Year: Not on file       Studies  Lab Results   Component Value Date/Time    CHOLSTRLTOT 150 06/21/2021 05:20 AM    LDL 77 06/21/2021 05:20 AM    HDL 44 06/21/2021 05:20 AM    TRIGLYCERIDE 144 06/21/2021 05:20 AM       Lab Results   Component Value Date/Time    SODIUM 140 06/21/2021 05:20 AM    POTASSIUM 4.0 06/21/2021 05:20 AM    CHLORIDE 107 06/21/2021 05:20 AM    CO2 25 06/21/2021 05:20 AM    GLUCOSE 93 06/21/2021 05:20 AM    BUN 5 (L) 06/21/2021 05:20 AM    CREATININE 0.58 06/21/2021 05:20 AM     Lab Results   Component Value Date/Time    ALKPHOSPHAT 67 06/20/2021 11:55 AM    ASTSGOT 20 06/20/2021 11:55 AM    ALTSGPT 28 06/20/2021 11:55 AM    TBILIRUBIN 0.3 06/20/2021 11:55 AM        For this encounter I reviewed the following medical records BMP, Lipid profile and CBC

## 2022-01-21 DIAGNOSIS — I27.24 CTEPH (CHRONIC THROMBOEMBOLIC PULMONARY HYPERTENSION) (HCC): ICD-10-CM

## 2022-01-28 ENCOUNTER — APPOINTMENT (OUTPATIENT)
Dept: RADIOLOGY | Facility: MEDICAL CENTER | Age: 62
End: 2022-01-28
Attending: INTERNAL MEDICINE
Payer: COMMERCIAL

## 2022-02-07 ENCOUNTER — TELEPHONE (OUTPATIENT)
Dept: CARDIOLOGY | Facility: MEDICAL CENTER | Age: 62
End: 2022-02-07

## 2022-02-07 DIAGNOSIS — I48.0 PAF (PAROXYSMAL ATRIAL FIBRILLATION) (HCC): ICD-10-CM

## 2022-02-07 NOTE — TELEPHONE ENCOUNTER
BE    Patient called needing a refill of apixaban (ELIQUIS) 5mg Tab. She is requesting we send it to [facility]. She would also like to know if she can go back to having her monthly masages.     Best Contact Number: [phone]

## 2022-02-07 NOTE — TELEPHONE ENCOUNTER
RX sent to updated pharmacy. Attempted to contact patient. Unable to leave message as voicemail has not been set up. Will try again tomorrow.

## 2022-02-08 NOTE — TELEPHONE ENCOUNTER
Attempted to reach patient for second time.  Unable to leave message as voicemail has not been set up.

## 2022-02-11 ENCOUNTER — HOSPITAL ENCOUNTER (OUTPATIENT)
Dept: RADIOLOGY | Facility: MEDICAL CENTER | Age: 62
End: 2022-02-11
Attending: INTERNAL MEDICINE
Payer: COMMERCIAL

## 2022-02-11 DIAGNOSIS — I27.24 CTEPH (CHRONIC THROMBOEMBOLIC PULMONARY HYPERTENSION) (HCC): ICD-10-CM

## 2022-02-11 PROCEDURE — 93970 EXTREMITY STUDY: CPT

## 2022-02-11 PROCEDURE — 93970 EXTREMITY STUDY: CPT | Mod: 26 | Performed by: INTERNAL MEDICINE

## 2022-03-25 ENCOUNTER — APPOINTMENT (OUTPATIENT)
Dept: CARDIOLOGY | Facility: MEDICAL CENTER | Age: 62
End: 2022-03-25
Attending: INTERNAL MEDICINE
Payer: COMMERCIAL

## 2022-04-04 ENCOUNTER — APPOINTMENT (RX ONLY)
Dept: URBAN - METROPOLITAN AREA CLINIC 35 | Facility: CLINIC | Age: 62
Setting detail: DERMATOLOGY
End: 2022-04-04

## 2022-04-04 DIAGNOSIS — D22 MELANOCYTIC NEVI: ICD-10-CM

## 2022-04-04 DIAGNOSIS — L81.4 OTHER MELANIN HYPERPIGMENTATION: ICD-10-CM

## 2022-04-04 DIAGNOSIS — Z71.89 OTHER SPECIFIED COUNSELING: ICD-10-CM

## 2022-04-04 DIAGNOSIS — L82.1 OTHER SEBORRHEIC KERATOSIS: ICD-10-CM

## 2022-04-04 DIAGNOSIS — Z85.828 PERSONAL HISTORY OF OTHER MALIGNANT NEOPLASM OF SKIN: ICD-10-CM

## 2022-04-04 DIAGNOSIS — L57.0 ACTINIC KERATOSIS: ICD-10-CM

## 2022-04-04 DIAGNOSIS — D18.0 HEMANGIOMA: ICD-10-CM

## 2022-04-04 DIAGNOSIS — L82.0 INFLAMED SEBORRHEIC KERATOSIS: ICD-10-CM

## 2022-04-04 PROBLEM — D22.72 MELANOCYTIC NEVI OF LEFT LOWER LIMB, INCLUDING HIP: Status: ACTIVE | Noted: 2022-04-04

## 2022-04-04 PROBLEM — D22.5 MELANOCYTIC NEVI OF TRUNK: Status: ACTIVE | Noted: 2022-04-04

## 2022-04-04 PROBLEM — D18.01 HEMANGIOMA OF SKIN AND SUBCUTANEOUS TISSUE: Status: ACTIVE | Noted: 2022-04-04

## 2022-04-04 PROCEDURE — ? COUNSELING

## 2022-04-04 PROCEDURE — 17000 DESTRUCT PREMALG LESION: CPT | Mod: 59

## 2022-04-04 PROCEDURE — ? SUNSCREEN RECOMMENDATIONS

## 2022-04-04 PROCEDURE — 99213 OFFICE O/P EST LOW 20 MIN: CPT | Mod: 25

## 2022-04-04 PROCEDURE — ? LIQUID NITROGEN

## 2022-04-04 PROCEDURE — 17110 DESTRUCTION B9 LES UP TO 14: CPT | Mod: 52

## 2022-04-04 PROCEDURE — ? OBSERVATION AND MEASURE

## 2022-04-04 ASSESSMENT — LOCATION DETAILED DESCRIPTION DERM
LOCATION DETAILED: EPIGASTRIC SKIN
LOCATION DETAILED: LEFT MEDIAL MALAR CHEEK
LOCATION DETAILED: RIGHT INFERIOR MEDIAL UPPER BACK
LOCATION DETAILED: LEFT CENTRAL POSTERIOR NECK
LOCATION DETAILED: RIGHT POSTERIOR SHOULDER
LOCATION DETAILED: RIGHT MEDIAL SUPERIOR CHEST
LOCATION DETAILED: RIGHT ULNAR DORSAL HAND
LOCATION DETAILED: RIGHT INFERIOR CENTRAL MALAR CHEEK
LOCATION DETAILED: LEFT PROXIMAL CALF
LOCATION DETAILED: LEFT ANTERIOR PROXIMAL THIGH

## 2022-04-04 ASSESSMENT — LOCATION SIMPLE DESCRIPTION DERM
LOCATION SIMPLE: LEFT THIGH
LOCATION SIMPLE: LEFT CHEEK
LOCATION SIMPLE: NECK
LOCATION SIMPLE: RIGHT HAND
LOCATION SIMPLE: CHEST
LOCATION SIMPLE: RIGHT UPPER BACK
LOCATION SIMPLE: RIGHT SHOULDER
LOCATION SIMPLE: LEFT CALF
LOCATION SIMPLE: RIGHT CHEEK
LOCATION SIMPLE: ABDOMEN

## 2022-04-04 ASSESSMENT — LOCATION ZONE DERM
LOCATION ZONE: FACE
LOCATION ZONE: LEG
LOCATION ZONE: ARM
LOCATION ZONE: TRUNK
LOCATION ZONE: NECK
LOCATION ZONE: HAND

## 2022-04-04 NOTE — PROCEDURE: COUNSELING
Detail Level: Detailed
Detail Level: Generalized
Patient Specific Counseling (Will Not Stick From Patient To Patient): Recommend hydrocortisone OTC for itch
Detail Level: Zone

## 2022-04-04 NOTE — PROCEDURE: LIQUID NITROGEN
Detail Level: Detailed
Render Post-Care Instructions In Note?: no
Post-Care Instructions: I reviewed with the patient in detail post-care instructions. Patient is to wear sunprotection, and avoid picking at any of the treated lesions. Pt may apply Vaseline to crusted or scabbing areas.
Consent: The patient's consent was obtained including but not limited to risks of crusting, scabbing, blistering, scarring, darker or lighter pigmentary change, recurrence, incomplete removal and infection.
Show Aperture Variable?: Yes
Duration Of Freeze Thaw-Cycle (Seconds): 2
Number Of Freeze-Thaw Cycles: 2 freeze-thaw cycles
Medical Necessity Information: It is in your best interest to select a reason for this procedure from the list below. All of these items fulfill various CMS LCD requirements except the new and changing color options.
Spray Paint Text: The liquid nitrogen was applied to the skin utilizing a spray paint frosting technique.
Medical Necessity Clause: This procedure was medically necessary because the lesions that were treated were:

## 2022-04-04 NOTE — PROCEDURE: OBSERVATION
Detail Level: Detailed
Size Of Lesion: 1 cm tan patch
Size Of Lesion: 2 mm
Morphology Per Location (Optional): dark brown uniform macule

## 2022-05-10 NOTE — ASSESSMENT & PLAN NOTE
Patient previously on   Converted to SR with amiodarone  Withholding home metoprolol  Patient on heparin full dose    Pt also taking:     Zinc 50mg/daily   Selenium 200micrograms  b12 2000micrograms  L-lysine 500mg  Magnesium 250 mg  Vit C powder 1gram (1000mg)    meliasterene  proline  iodione   Olive leaf citrine

## 2022-05-12 ENCOUNTER — TELEPHONE (OUTPATIENT)
Dept: CARDIOLOGY | Facility: MEDICAL CENTER | Age: 62
End: 2022-05-12
Payer: COMMERCIAL

## 2022-05-12 NOTE — TELEPHONE ENCOUNTER
Valeria and Dr Dias,    Simi HOWE is referring this patient to Dr. Dias for vein ablation. Dr. Dias doesn't have anything anytime soon. Would it be ok to schedule this patient to see Valeria dias discuss or does this have to only be scheduled with Dr. Dias only?    Thank You,  Lilly

## 2022-05-24 ENCOUNTER — TELEPHONE (OUTPATIENT)
Dept: CARDIOLOGY | Facility: MEDICAL CENTER | Age: 62
End: 2022-05-24
Payer: COMMERCIAL

## 2022-05-24 NOTE — TELEPHONE ENCOUNTER
Spoke to patient. Will email cancellation letter to [email or web address] and to patients email.     BE    Caller: Molly Jesus    Topic/issue: Pt needs to have an e-mail sent over stating we cancelled the appt for Thursday with Dr. Jayden Tomlin in order for them to cancel the room with [facility] and not be changed a cancellation fee. [facility] stated it needs to show it was done by the Dr and not them.    Callback Number:[phone] (home) [phone] (work)    E-mail is on file    Thank You  Maxine KING

## 2022-06-29 ENCOUNTER — OFFICE VISIT (OUTPATIENT)
Dept: CARDIOLOGY | Facility: MEDICAL CENTER | Age: 62
End: 2022-06-29
Payer: COMMERCIAL

## 2022-06-29 ENCOUNTER — TELEPHONE (OUTPATIENT)
Dept: CARDIOLOGY | Facility: MEDICAL CENTER | Age: 62
End: 2022-06-29

## 2022-06-29 VITALS
SYSTOLIC BLOOD PRESSURE: 108 MMHG | DIASTOLIC BLOOD PRESSURE: 80 MMHG | OXYGEN SATURATION: 98 % | RESPIRATION RATE: 14 BRPM | BODY MASS INDEX: 30.12 KG/M2 | WEIGHT: 170 LBS | HEART RATE: 65 BPM | HEIGHT: 63 IN

## 2022-06-29 DIAGNOSIS — E78.5 DYSLIPIDEMIA: ICD-10-CM

## 2022-06-29 DIAGNOSIS — I27.24 CTEPH (CHRONIC THROMBOEMBOLIC PULMONARY HYPERTENSION) (HCC): ICD-10-CM

## 2022-06-29 DIAGNOSIS — I83.812 VARICOSE VEINS OF LEFT LOWER EXTREMITY WITH PAIN: ICD-10-CM

## 2022-06-29 DIAGNOSIS — I48.0 PAF (PAROXYSMAL ATRIAL FIBRILLATION) (HCC): ICD-10-CM

## 2022-06-29 DIAGNOSIS — R93.1 AGATSTON CAC SCORE, <100: ICD-10-CM

## 2022-06-29 LAB — EKG IMPRESSION: NORMAL

## 2022-06-29 PROCEDURE — 93000 ELECTROCARDIOGRAM COMPLETE: CPT | Performed by: INTERNAL MEDICINE

## 2022-06-29 PROCEDURE — 99214 OFFICE O/P EST MOD 30 MIN: CPT | Performed by: INTERNAL MEDICINE

## 2022-06-29 ASSESSMENT — ENCOUNTER SYMPTOMS
FEVER: 0
CHILLS: 0
ABDOMINAL PAIN: 0
DIZZINESS: 0
BLOOD IN STOOL: 0
FALLS: 0
SHORTNESS OF BREATH: 0
EYE PAIN: 0
DOUBLE VISION: 0
NAUSEA: 0
DEPRESSION: 0
BRUISES/BLEEDS EASILY: 0
ORTHOPNEA: 0
HALLUCINATIONS: 0
LOSS OF CONSCIOUSNESS: 0
SPEECH CHANGE: 0
WEIGHT LOSS: 0
EYE DISCHARGE: 0
MYALGIAS: 0
VOMITING: 0
SENSORY CHANGE: 0
BLURRED VISION: 0
HEADACHES: 0
PALPITATIONS: 0
PND: 0
CLAUDICATION: 0
COUGH: 0

## 2022-06-29 ASSESSMENT — FIBROSIS 4 INDEX: FIB4 SCORE: 1.04

## 2022-06-29 NOTE — PROGRESS NOTES
Chief Complaint   Patient presents with   • Atrial Fibrillation     F/V DX: PAF (paroxysmal atrial fibrillation) ([facility])        Subjective     Molly Jesus is a 62 y.o. female who presents today for cardiac care and evaluation for possible vein treatment due to symptomatic painful varicose vein of her left lower extremity.  Patient did have venous duplex which show significant reflux disease in the left greater saphenous vein at multiple levels from thigh to below knee.  Patient did have prior vein treatment of stripping before in the past many years ago.  It sounded like it was small saphenous vein stripping at the time.  Although I do not have records.    Of note, patient has tried conservative treatment of compression stocking thigh-high for 6 months without success.    Past Medical History:   Diagnosis Date   • Bipolar 1 disorder (HCC)    • Carpal tunnel syndrome    • GERD (gastroesophageal reflux disease)    • Hiatal hernia    • HLD (hyperlipidemia) 6/17/2015   • HLD (hyperlipidemia) 6/17/2015   • Hypothyroid    • Osteopenia    • Ulcerative colitis (HCC)      Past Surgical History:   Procedure Laterality Date   • HYSTERECTOMY, TOTAL ABDOMINAL     • KNEE ARTHROSCOPY       Family History   Problem Relation Age of Onset   • Heart Disease Sister         Mitral valve prolapse     Social History     Socioeconomic History   • Marital status:      Spouse name: Not on file   • Number of children: Not on file   • Years of education: Not on file   • Highest education level: Not on file   Occupational History   • Not on file   Tobacco Use   • Smoking status: Former Smoker   • Smokeless tobacco: Never Used   Vaping Use   • Vaping Use: Never used   Substance and Sexual Activity   • Alcohol use: Yes     Alcohol/week: 1.2 oz     Types: 2 Shots of liquor per week   • Drug use: No   • Sexual activity: Never   Other Topics Concern   • Not on file   Social History Narrative   • Not on file     Social Determinants  "of Health     Financial Resource Strain: Not on file   Food Insecurity: Not on file   Transportation Needs: Not on file   Physical Activity: Not on file   Stress: Not on file   Social Connections: Not on file   Intimate Partner Violence: Not on file   Housing Stability: Not on file     Allergies   Allergen Reactions   • Penicillins Hives   • Levaquin [Levofloxacin] Myalgia     Knee pain   • Morphine Vomiting     Severe vomiting   • Other Food Unspecified     Patient states \"gluten contributes to ulcerative colitis flair ups\"   • Milk Products Food Allergy Unspecified     Patient reports intolerance to milk and dairy due to gas pain      Outpatient Encounter Medications as of 6/29/2022   Medication Sig Dispense Refill   • apixaban (ELIQUIS) 5mg Tab Take 1 Tablet by mouth 2 times a day. 180 Tablet 3   • pantoprazole (PROTONIX) 40 MG Pack Take 40 mg by mouth every day.     • Red Yeast Rice Extract (RED YEAST RICE PO) Take 1,200 mg by mouth.     • Cranberry 500 MG Cap Take  by mouth.     • propafenone (RYTHMOL) 300 MG tablet Take 1 Tablet by mouth 1 time a day as needed (prolonged palpitations from atrial fibrillation). 5 Tablet 6   • loratadine (CLARITIN) 10 MG Tab Take 10 mg by mouth 2 times a day.     • PARoxetine (PAXIL) 20 MG Tab Take 20 mg by mouth every day.     • metoprolol SR (TOPROL XL) 50 MG TB24 Take 50 mg by mouth every day.     • levothyroxine (SYNTHROID) 25 MCG TABS Take 25 mcg by mouth Every morning on an empty stomach.     • mesalamine (LIALDA) 1.2 GM Tablet Delayed Response Take 2.4 g by mouth 2 times a day.       No facility-administered encounter medications on file as of 6/29/2022.     Review of Systems   Constitutional: Negative for chills, fever, malaise/fatigue and weight loss.   HENT: Negative for ear discharge, ear pain, hearing loss and nosebleeds.    Eyes: Negative for blurred vision, double vision, pain and discharge.   Respiratory: Negative for cough and shortness of breath.  " "  Cardiovascular: Negative for chest pain, palpitations, orthopnea, claudication, leg swelling and PND.   Gastrointestinal: Negative for abdominal pain, blood in stool, melena, nausea and vomiting.   Genitourinary: Negative for dysuria and hematuria.   Musculoskeletal: Negative for falls, joint pain and myalgias.        + leg pain at the site of varicose veins.   Skin: Negative for itching and rash.   Neurological: Negative for dizziness, sensory change, speech change, loss of consciousness and headaches.   Endo/Heme/Allergies: Negative for environmental allergies. Does not bruise/bleed easily.   Psychiatric/Behavioral: Negative for depression, hallucinations and suicidal ideas.     Objective     /80 (BP Location: Left arm, Patient Position: Sitting, BP Cuff Size: Adult)   Pulse 65   Resp 14   Ht 1.6 m (5' 3\")   Wt 77.1 kg (170 lb)   SpO2 98%   BMI 30.11 kg/m²   Constitutional:   Well appearing, no acute distress  Heart: no pitting edema in BLE.  Lungs: no breathing distress, not tachypneic  Abdomen: no distention  Neurology: no signs of focal deficits.  Mentation is alert.  There is significant varicose veins in the left lower extremity with pain upon direct palpation.    Assessment & Plan     1. Varicose veins of left lower extremity with pain  US-EXTREMITY VENOUS LOWER UNILAT LEFT    US-VEIN ABLATION VENASEAL UNILATERAL LEFT   2. PAF (paroxysmal atrial fibrillation) ([facility])  EKG   3. Agatston CAC score 0.0 on 6/2018     4. Dyslipidemia     5. CTEPH (chronic thromboembolic pulmonary hypertension) ([facility])         Medical Decision Making: Today's Assessment/Status/Plan:   At this time, patient is very symptomatic in terms of varicose veins and venous insufficiency, CEAP of C4A. Patient tried compression stockings for 6 months and had failed such conservative therapy. Therefore, she is indicated to further undergo vein venoseal of the LEFT greater saphenous vein for treatment of venous insufficiency and " reflux disease to improve quality of life and symptomatology. Risks and benefits were explained to patient and patient has agreed to proceed.

## 2022-06-29 NOTE — TELEPHONE ENCOUNTER
----- Message from Diamond Tomlin M.D. sent at 6/29/2022 11:55 AM PDT -----  Venaseal patient for next vein day.    Diamond Tomlin M.D.

## 2022-07-13 ENCOUNTER — TELEPHONE (OUTPATIENT)
Dept: CARDIOLOGY | Facility: MEDICAL CENTER | Age: 62
End: 2022-07-13
Payer: COMMERCIAL

## 2022-07-13 NOTE — TELEPHONE ENCOUNTER
Patient is scheduled on 8-10-22 for a Venaseal ablation unilateral left with Dr. Tomlin. Patient to check in at 12:00 for a 12:15 procedure. Follow up u/s will be done on 8-12-22 at 12:15.

## 2022-07-15 ENCOUNTER — TELEPHONE (OUTPATIENT)
Dept: CARDIOLOGY | Facility: MEDICAL CENTER | Age: 62
End: 2022-07-15
Payer: COMMERCIAL

## 2022-07-15 NOTE — TELEPHONE ENCOUNTER
LM for pt to c/b to let me know about what insurance she has due to her [employer] showing that it has been termed.

## 2022-10-14 ENCOUNTER — OFFICE VISIT (OUTPATIENT)
Dept: CARDIOLOGY | Facility: MEDICAL CENTER | Age: 62
End: 2022-10-14
Payer: COMMERCIAL

## 2022-10-14 VITALS
DIASTOLIC BLOOD PRESSURE: 62 MMHG | BODY MASS INDEX: 30.12 KG/M2 | WEIGHT: 170 LBS | SYSTOLIC BLOOD PRESSURE: 110 MMHG | RESPIRATION RATE: 16 BRPM | OXYGEN SATURATION: 96 % | HEART RATE: 75 BPM | HEIGHT: 63 IN

## 2022-10-14 DIAGNOSIS — I83.812 VARICOSE VEINS OF LEFT LOWER EXTREMITY WITH PAIN: ICD-10-CM

## 2022-10-14 DIAGNOSIS — E78.5 DYSLIPIDEMIA: ICD-10-CM

## 2022-10-14 DIAGNOSIS — I48.0 PAF (PAROXYSMAL ATRIAL FIBRILLATION) (HCC): ICD-10-CM

## 2022-10-14 DIAGNOSIS — R93.1 AGATSTON CAC SCORE, <100: ICD-10-CM

## 2022-10-14 PROCEDURE — 99214 OFFICE O/P EST MOD 30 MIN: CPT | Performed by: INTERNAL MEDICINE

## 2022-10-14 RX ORDER — CEFUROXIME AXETIL 500 MG/1
TABLET ORAL
COMMUNITY
Start: 2022-08-25 | End: 2022-10-14

## 2022-10-14 RX ORDER — NITROFURANTOIN 25; 75 MG/1; MG/1
CAPSULE ORAL
COMMUNITY
Start: 2022-08-19 | End: 2022-10-14

## 2022-10-14 ASSESSMENT — ENCOUNTER SYMPTOMS
FALLS: 0
ORTHOPNEA: 0
SORE THROAT: 0
SHORTNESS OF BREATH: 0
PND: 0
BLOOD IN STOOL: 0
CLAUDICATION: 0
COUGH: 0
DIZZINESS: 0
BRUISES/BLEEDS EASILY: 0
EYE REDNESS: 0
DEPRESSION: 0
PALPITATIONS: 0

## 2022-10-14 ASSESSMENT — FIBROSIS 4 INDEX: FIB4 SCORE: 1.04

## 2022-10-14 NOTE — PROGRESS NOTES
Chief Complaint   Patient presents with    Varicose Veins     F/V Dx: Varicose veins of left lower extremity with pain       Subjective     Molly Danielle Jesus is a 62 y.o. female who presents today for cardiac care and evaluation for possible vein treatment due to symptomatic painful varicose vein of her left lower extremity.  Patient did have venous duplex which show significant reflux disease in the left greater saphenous vein at multiple levels from thigh to below knee.  Patient did have prior vein treatment of stripping before in the past many years ago.  It sounded like it was small saphenous vein stripping at the time.  Although I do not have records. Of note, anatomy of the left GSV not suitable for vein treatment.     In the interim, patient has been doing well without having any symptoms. Patient denies having chest pain, dyspnea, palpitation, presyncope, syncope episodes.    Past Medical History:   Diagnosis Date    Bipolar 1 disorder (HCC)     Carpal tunnel syndrome     GERD (gastroesophageal reflux disease)     Hiatal hernia     HLD (hyperlipidemia) 6/17/2015    HLD (hyperlipidemia) 6/17/2015    Hypothyroid     Osteopenia     Ulcerative colitis (HCC)      Past Surgical History:   Procedure Laterality Date    HYSTERECTOMY, TOTAL ABDOMINAL      KNEE ARTHROSCOPY       Family History   Problem Relation Age of Onset    Heart Disease Sister         Mitral valve prolapse     Social History     Socioeconomic History    Marital status:      Spouse name: Not on file    Number of children: Not on file    Years of education: Not on file    Highest education level: Not on file   Occupational History    Not on file   Tobacco Use    Smoking status: Former    Smokeless tobacco: Never   Vaping Use    Vaping Use: Never used   Substance and Sexual Activity    Alcohol use: Not Currently     Alcohol/week: 8.4 oz     Types: 14 Standard drinks or equivalent per week     Comment: daily    Drug use: No    Sexual  "activity: Never   Other Topics Concern    Not on file   Social History Narrative    Not on file     Social Determinants of Health     Financial Resource Strain: Not on file   Food Insecurity: Not on file   Transportation Needs: Not on file   Physical Activity: Not on file   Stress: Not on file   Social Connections: Not on file   Intimate Partner Violence: Not on file   Housing Stability: Not on file     Allergies   Allergen Reactions    Penicillins Hives    Levaquin [Levofloxacin] Myalgia     Knee pain    Morphine Vomiting     Severe vomiting    Other Food Unspecified     Patient states \"gluten contributes to ulcerative colitis flair ups\"    Milk Products Food Allergy Unspecified     Patient reports intolerance to milk and dairy due to gas pain      Outpatient Encounter Medications as of 10/14/2022   Medication Sig Dispense Refill    Omeprazole Magnesium (PRILOSEC PO) Take 40 mg by mouth every day.      apixaban (ELIQUIS) 5mg Tab Take 1 Tablet by mouth 2 times a day. 180 Tablet 3    Red Yeast Rice Extract (RED YEAST RICE PO) Take 1,200 mg by mouth.      Cranberry 500 MG Cap Take  by mouth.      propafenone (RYTHMOL) 300 MG tablet Take 1 Tablet by mouth 1 time a day as needed (prolonged palpitations from atrial fibrillation). 5 Tablet 6    loratadine (CLARITIN) 10 MG Tab Take 10 mg by mouth 2 times a day.      PARoxetine (PAXIL) 20 MG Tab Take 20 mg by mouth every day.      metoprolol SR (TOPROL XL) 50 MG TB24 Take 50 mg by mouth every day.      levothyroxine (SYNTHROID) 25 MCG TABS Take 25 mcg by mouth Every morning on an empty stomach.      mesalamine (LIALDA) 1.2 GM Tablet Delayed Response Take 2.4 g by mouth 2 times a day.      [DISCONTINUED] nitrofurantoin (MACROBID) 100 MG Cap TAKE ONE CAPSULE BY MOUTH EVERY TWELVE HOURS FOR FIVE DAYS (Patient not taking: Reported on 10/14/2022)      [DISCONTINUED] cefUROXime (CEFTIN) 500 MG Tab TAKE ONE TABLET BY MOUTH EVERY 12 HOURS FOR 5 DAYS (Patient not taking: Reported on " "10/14/2022)      [DISCONTINUED] pantoprazole (PROTONIX) 40 MG Pack Take 40 mg by mouth every day. (Patient not taking: Reported on 10/14/2022)       No facility-administered encounter medications on file as of 10/14/2022.     Review of Systems   Constitutional:  Negative for malaise/fatigue.   HENT:  Negative for sore throat.    Eyes:  Negative for redness.   Respiratory:  Negative for cough and shortness of breath.    Cardiovascular:  Negative for chest pain, palpitations, orthopnea, claudication, leg swelling and PND.   Gastrointestinal:  Negative for blood in stool and melena.   Musculoskeletal:  Negative for falls.   Skin:  Negative for rash.   Neurological:  Negative for dizziness.   Endo/Heme/Allergies:  Does not bruise/bleed easily.   Psychiatric/Behavioral:  Negative for depression.    Objective     /62 (BP Location: Left arm, Patient Position: Sitting, BP Cuff Size: Adult)   Pulse 75   Resp 16   Ht 1.6 m (5' 3\")   Wt 77.1 kg (170 lb)   SpO2 96%   BMI 30.11 kg/m²   Constitutional:   Well appearing, no acute distress  Heart: no pitting edema in BLE.  Lungs: no breathing distress, not tachypneic  Abdomen: no distention  Neurology: no signs of focal deficits.  Mentation is alert.  There is significant varicose veins in the left lower extremity with pain upon direct palpation.    Assessment & Plan     1. Varicose veins of left lower extremity with pain        2. PAF (paroxysmal atrial fibrillation) ([facility])        3. Agatston CAC score 0.0 on 6/2018        4. Dyslipidemia            Medical Decision Making: Today's Assessment/Status/Plan:   At this time patient is clinically stable in terms of her cardiac standpoint.  Conservative treatment for reflux disease.  Blood pressure is well controlled.                       CARDIOPHYEXAMBYAGE  "

## 2023-05-22 ENCOUNTER — APPOINTMENT (RX ONLY)
Dept: URBAN - METROPOLITAN AREA CLINIC 35 | Facility: CLINIC | Age: 63
Setting detail: DERMATOLOGY
End: 2023-05-22

## 2023-05-22 DIAGNOSIS — D22 MELANOCYTIC NEVI: ICD-10-CM

## 2023-05-22 DIAGNOSIS — D18.0 HEMANGIOMA: ICD-10-CM

## 2023-05-22 DIAGNOSIS — L81.4 OTHER MELANIN HYPERPIGMENTATION: ICD-10-CM

## 2023-05-22 DIAGNOSIS — Z85.828 PERSONAL HISTORY OF OTHER MALIGNANT NEOPLASM OF SKIN: ICD-10-CM

## 2023-05-22 DIAGNOSIS — Z71.89 OTHER SPECIFIED COUNSELING: ICD-10-CM

## 2023-05-22 DIAGNOSIS — L82.1 OTHER SEBORRHEIC KERATOSIS: ICD-10-CM

## 2023-05-22 PROBLEM — D22.72 MELANOCYTIC NEVI OF LEFT LOWER LIMB, INCLUDING HIP: Status: ACTIVE | Noted: 2023-05-22

## 2023-05-22 PROBLEM — D18.01 HEMANGIOMA OF SKIN AND SUBCUTANEOUS TISSUE: Status: ACTIVE | Noted: 2023-05-22

## 2023-05-22 PROBLEM — D22.5 MELANOCYTIC NEVI OF TRUNK: Status: ACTIVE | Noted: 2023-05-22

## 2023-05-22 PROCEDURE — ? OBSERVATION AND MEASURE

## 2023-05-22 PROCEDURE — ? COUNSELING

## 2023-05-22 PROCEDURE — ? SUNSCREEN RECOMMENDATIONS

## 2023-05-22 PROCEDURE — 99213 OFFICE O/P EST LOW 20 MIN: CPT

## 2023-05-22 ASSESSMENT — LOCATION SIMPLE DESCRIPTION DERM
LOCATION SIMPLE: CHEST
LOCATION SIMPLE: RIGHT UPPER BACK
LOCATION SIMPLE: RIGHT SHOULDER
LOCATION SIMPLE: ABDOMEN
LOCATION SIMPLE: RIGHT SCALP
LOCATION SIMPLE: RIGHT HAND
LOCATION SIMPLE: LEFT UPPER BACK
LOCATION SIMPLE: LEFT CALF
LOCATION SIMPLE: LEFT CHEEK
LOCATION SIMPLE: RIGHT BREAST

## 2023-05-22 ASSESSMENT — LOCATION DETAILED DESCRIPTION DERM
LOCATION DETAILED: RIGHT ULNAR DORSAL HAND
LOCATION DETAILED: RIGHT INFRAMAMMARY CREASE (INNER QUADRANT)
LOCATION DETAILED: RIGHT CENTRAL FRONTAL SCALP
LOCATION DETAILED: RIGHT MEDIAL SUPERIOR CHEST
LOCATION DETAILED: LEFT PROXIMAL CALF
LOCATION DETAILED: RIGHT POSTERIOR SHOULDER
LOCATION DETAILED: LEFT MEDIAL MALAR CHEEK
LOCATION DETAILED: LEFT RIB CAGE
LOCATION DETAILED: LEFT INFERIOR LATERAL UPPER BACK
LOCATION DETAILED: RIGHT INFERIOR MEDIAL UPPER BACK

## 2023-05-22 ASSESSMENT — LOCATION ZONE DERM
LOCATION ZONE: FACE
LOCATION ZONE: SCALP
LOCATION ZONE: HAND
LOCATION ZONE: TRUNK
LOCATION ZONE: ARM
LOCATION ZONE: LEG

## 2023-05-22 NOTE — PROCEDURE: SUNSCREEN RECOMMENDATIONS
Attending Only
Detail Level: Detailed
General Sunscreen Counseling: I recommended a broad spectrum sunscreen with a SPF of 30 or higher.  I explained that SPF 30 sunscreens block approximately 97 percent of the sun's harmful rays.  Sunscreens should be applied at least 15 minutes prior to expected sun exposure and then every 2 hours after that as long as sun exposure continues. If swimming or exercising sunscreen should be reapplied every 45 minutes to an hour after getting wet or sweating.  One ounce, or the equivalent of a shot glass full of sunscreen, is adequate to protect the skin not covered by a bathing suit. I also recommended a lip balm with a sunscreen as well. Sun protective clothing can be used in lieu of sunscreen but must be worn the entire time you are exposed to the sun's rays.

## 2023-05-22 NOTE — PROCEDURE: OBSERVATION
Detail Level: Detailed
Size Of Lesion: 2 mm dark brown uniform macule
Size Of Lesion: 1 cm tan patch
Size Of Lesion: 6 mm brown macule

## 2023-08-28 ENCOUNTER — APPOINTMENT (RX ONLY)
Dept: URBAN - METROPOLITAN AREA CLINIC 35 | Facility: CLINIC | Age: 63
Setting detail: DERMATOLOGY
End: 2023-08-28

## 2023-08-28 DIAGNOSIS — Z71.89 OTHER SPECIFIED COUNSELING: ICD-10-CM

## 2023-08-28 DIAGNOSIS — Q82.8 OTHER SPECIFIED CONGENITAL MALFORMATIONS OF SKIN: ICD-10-CM | Status: INADEQUATELY CONTROLLED

## 2023-08-28 DIAGNOSIS — L82.0 INFLAMED SEBORRHEIC KERATOSIS: ICD-10-CM

## 2023-08-28 PROCEDURE — ? SUNSCREEN RECOMMENDATIONS

## 2023-08-28 PROCEDURE — 99214 OFFICE O/P EST MOD 30 MIN: CPT | Mod: 25

## 2023-08-28 PROCEDURE — ? LIQUID NITROGEN

## 2023-08-28 PROCEDURE — ? TREATMENT REGIMEN

## 2023-08-28 PROCEDURE — 17110 DESTRUCTION B9 LES UP TO 14: CPT | Mod: 52

## 2023-08-28 PROCEDURE — ? PRESCRIPTION

## 2023-08-28 PROCEDURE — ? MEDICATION COUNSELING

## 2023-08-28 PROCEDURE — ? COUNSELING

## 2023-08-28 PROCEDURE — ? DIAGNOSIS COMMENT

## 2023-08-28 RX ORDER — CLOBETASOL PROPIONATE 0.5 MG/G
1 CREAM TOPICAL DAILY
Qty: 15 | Refills: 3 | Status: ERX | COMMUNITY
Start: 2023-08-28

## 2023-08-28 RX ADMIN — CLOBETASOL PROPIONATE 1: 0.5 CREAM TOPICAL at 00:00

## 2023-08-28 ASSESSMENT — LOCATION DETAILED DESCRIPTION DERM
LOCATION DETAILED: RIGHT CENTRAL FRONTAL SCALP
LOCATION DETAILED: RIGHT CENTRAL PARIETAL SCALP
LOCATION DETAILED: LEFT DISTAL DORSAL INDEX FINGER

## 2023-08-28 ASSESSMENT — LOCATION ZONE DERM
LOCATION ZONE: SCALP
LOCATION ZONE: FINGER

## 2023-08-28 ASSESSMENT — LOCATION SIMPLE DESCRIPTION DERM
LOCATION SIMPLE: RIGHT SCALP
LOCATION SIMPLE: SCALP
LOCATION SIMPLE: LEFT INDEX FINGER

## 2023-08-28 NOTE — PROCEDURE: TREATMENT REGIMEN
Detail Level: Zone
Initiate Treatment: - clobetasol 0.05 % topical cream. Apply to affected area twice daily at first sign of flare, no longer than 2 weeks

## 2023-08-28 NOTE — PROCEDURE: LIQUID NITROGEN
Show Spray Paint Technique Variable?: Yes
Medical Necessity Information: It is in your best interest to select a reason for this procedure from the list below. All of these items fulfill various CMS LCD requirements except the new and changing color options.
Medical Necessity Clause: This procedure was medically necessary because the lesions that were treated were:
Consent: The patient's consent was obtained including but not limited to risks of crusting, scabbing, blistering, scarring, darker or lighter pigmentary change, recurrence, incomplete removal and infection.
Post-Care Instructions: I reviewed with the patient in detail post-care instructions. Patient is to wear sunprotection, and avoid picking at any of the treated lesions. Pt may apply Vaseline to crusted or scabbing areas.
Detail Level: Detailed
Number Of Freeze-Thaw Cycles: 2 freeze-thaw cycles
Spray Paint Text: The liquid nitrogen was applied to the skin utilizing a spray paint frosting technique.
Spray Paint Technique: No

## 2023-08-28 NOTE — PROCEDURE: MEDICATION COUNSELING
Ilumya Pregnancy And Lactation Text: The risk during pregnancy and breastfeeding is uncertain with this medication.
Low Dose Naltrexone Pregnancy And Lactation Text: Naltrexone is pregnancy category C.  There have been no adequate and well-controlled studies in pregnant women.  It should be used in pregnancy only if the potential benefit justifies the potential risk to the fetus.   Limited data indicates that naltrexone is minimally excreted into breastmilk.
Spironolactone Pregnancy And Lactation Text: This medication can cause feminization of the male fetus and should be avoided during pregnancy. The active metabolite is also found in breast milk.
Soolantra Pregnancy And Lactation Text: This medication is Pregnancy Category C. This medication is considered safe during breast feeding.
Minoxidil Counseling: Minoxidil is a topical medication which can increase blood flow where it is applied. It is uncertain how this medication increases hair growth. Side effects are uncommon and include stinging and allergic reactions.
Clindamycin Pregnancy And Lactation Text: This medication can be used in pregnancy if certain situations. Clindamycin is also present in breast milk.
Oral Minoxidil Pregnancy And Lactation Text: This medication should only be used when clearly needed if you are pregnant, attempting to become pregnant or breast feeding.
Minocycline Pregnancy And Lactation Text: This medication is Pregnancy Category D and not consider safe during pregnancy. It is also excreted in breast milk.
Rinvoq Counseling: I discussed with the patient the risks of Rinvoq therapy including but not limited to upper respiratory tract infections, shingles, cold sores, bronchitis, nausea, cough, fever, acne, and headache. Live vaccines should be avoided.  This medication has been linked to serious infections; higher rate of mortality; malignancy and lymphoproliferative disorders; major adverse cardiovascular events; thrombosis; thrombocytopenia, anemia, and neutropenia; lipid elevations; liver enzyme elevations; and gastrointestinal perforations.
Odomzo Pregnancy And Lactation Text: This medication is Pregnancy Category X and is absolutely contraindicated during pregnancy. It is unknown if it is excreted in breast milk.
Cimzia Pregnancy And Lactation Text: This medication crosses the placenta but can be considered safe in certain situations. Cimzia may be excreted in breast milk.
Ketoconazole Pregnancy And Lactation Text: This medication is Pregnancy Category C and it isn't know if it is safe during pregnancy. It is also excreted in breast milk and breast feeding isn't recommended.
Protopic Counseling: Patient may experience a mild burning sensation during topical application. Protopic is not approved in children less than 2 years of age. There have been case reports of hematologic and skin malignancies in patients using topical calcineurin inhibitors although causality is questionable.
Opioid Counseling: I discussed with the patient the potential side effects of opioids including but not limited to addiction, altered mental status, and depression. I stressed avoiding alcohol, benzodiazepines, muscle relaxants and sleep aids unless specifically okayed by a physician. The patient verbalized understanding of the proper use and possible adverse effects of opioids. All of the patient's questions and concerns were addressed. They were instructed to flush the remaining pills down the toilet if they did not need them for pain.
Sski Pregnancy And Lactation Text: This medication is Pregnancy Category D and isn't considered safe during pregnancy. It is excreted in breast milk.
Methotrexate Counseling:  Patient counseled regarding adverse effects of methotrexate including but not limited to nausea, vomiting, abnormalities in liver function tests. Patients may develop mouth sores, rash, diarrhea, and abnormalities in blood counts. The patient understands that monitoring is required including LFT's and blood counts.  There is a rare possibility of scarring of the liver and lung problems that can occur when taking methotrexate. Persistent nausea, loss of appetite, pale stools, dark urine, cough, and shortness of breath should be reported immediately. Patient advised to discontinue methotrexate treatment at least three months before attempting to become pregnant.  I discussed the need for folate supplements while taking methotrexate.  These supplements can decrease side effects during methotrexate treatment. The patient verbalized understanding of the proper use and possible adverse effects of methotrexate.  All of the patient's questions and concerns were addressed.
Dapsone Counseling: I discussed with the patient the risks of dapsone including but not limited to hemolytic anemia, agranulocytosis, rashes, methemoglobinemia, kidney failure, peripheral neuropathy, headaches, GI upset, and liver toxicity.  Patients who start dapsone require monitoring including baseline LFTs and weekly CBCs for the first month, then every month thereafter.  The patient verbalized understanding of the proper use and possible adverse effects of dapsone.  All of the patient's questions and concerns were addressed.
Elidel Pregnancy And Lactation Text: This medication is Pregnancy Category C. It is unknown if this medication is excreted in breast milk.
Carac Pregnancy And Lactation Text: This medication is Pregnancy Category X and contraindicated in pregnancy and in women who may become pregnant. It is unknown if this medication is excreted in breast milk.
Topical Metronidazole Counseling: Metronidazole is a topical antibiotic medication. You may experience burning, stinging, redness, or allergic reactions.  Please call our office if you develop any problems from using this medication.
Low Dose Naltrexone Counseling- I discussed with the patient the potential risks and side effects of low dose naltrexone including but not limited to: more vivid dreams, headaches, nausea, vomiting, abdominal pain, fatigue, dizziness, and anxiety.
Ilumya Counseling: I discussed with the patient the risks of tildrakizumab including but not limited to immunosuppression, malignancy, posterior leukoencephalopathy syndrome, and serious infections.  The patient understands that monitoring is required including a PPD at baseline and must alert us or the primary physician if symptoms of infection or other concerning signs are noted.
Clindamycin Counseling: I counseled the patient regarding use of clindamycin as an antibiotic for prophylactic and/or therapeutic purposes. Clindamycin is active against numerous classes of bacteria, including skin bacteria. Side effects may include nausea, diarrhea, gastrointestinal upset, rash, hives, yeast infections, and in rare cases, colitis.
Topical Retinoid counseling:  Patient advised to apply a pea-sized amount only at bedtime and wait 30 minutes after washing their face before applying.  If too drying, patient may add a non-comedogenic moisturizer. The patient verbalized understanding of the proper use and possible adverse effects of retinoids.  All of the patient's questions and concerns were addressed.
Albendazole Counseling:  I discussed with the patient the risks of albendazole including but not limited to cytopenia, kidney damage, nausea/vomiting and severe allergy.  The patient understands that this medication is being used in an off-label manner.
Spironolactone Counseling: Patient advised regarding risks of diarrhea, abdominal pain, hyperkalemia, birth defects (for female patients), liver toxicity and renal toxicity. The patient may need blood work to monitor liver and kidney function and potassium levels while on therapy. The patient verbalized understanding of the proper use and possible adverse effects of spironolactone.  All of the patient's questions and concerns were addressed.
Klisyri Pregnancy And Lactation Text: It is unknown if this medication can harm a developing fetus or if it is excreted in breast milk.
Olumiant Pregnancy And Lactation Text: Based on animal studies, Olumiant may cause embryo-fetal harm when administered to pregnant women.  The medication should not be used in pregnancy.  Breastfeeding is not recommended during treatment.
Winlevi Counseling:  I discussed with the patient the risks of topical clascoterone including but not limited to erythema, scaling, itching, and stinging. Patient voiced their understanding.
Oral Minoxidil Counseling- I discussed with the patient the risks of oral minoxidil including but not limited to shortness of breath, swelling of the feet or ankles, dizziness, lightheadedness, unwanted hair growth and allergic reaction.  The patient verbalized understanding of the proper use and possible adverse effects of oral minoxidil.  All of the patient's questions and concerns were addressed.
Qbrexza Counseling:  I discussed with the patient the risks of Qbrexza including but not limited to headache, mydriasis, blurred vision, dry eyes, nasal dryness, dry mouth, dry throat, dry skin, urinary hesitation, and constipation.  Local skin reactions including erythema, burning, stinging, and itching can also occur.
Terbinafine Pregnancy And Lactation Text: This medication is Pregnancy Category B and is considered safe during pregnancy. It is also excreted in breast milk and breast feeding isn't recommended.
Cosentyx Counseling:  I discussed with the patient the risks of Cosentyx including but not limited to worsening of Crohn's disease, immunosuppression, allergic reactions and infections.  The patient understands that monitoring is required including a PPD at baseline and must alert us or the primary physician if symptoms of infection or other concerning signs are noted.
Simponi Counseling:  I discussed with the patient the risks of golimumab including but not limited to myelosuppression, immunosuppression, autoimmune hepatitis, demyelinating diseases, lymphoma, and serious infections.  The patient understands that monitoring is required including a PPD at baseline and must alert us or the primary physician if symptoms of infection or other concerning signs are noted.
Minocycline Counseling: Patient advised regarding possible photosensitivity and discoloration of the teeth, skin, lips, tongue and gums.  Patient instructed to avoid sunlight, if possible.  When exposed to sunlight, patients should wear protective clothing, sunglasses, and sunscreen.  The patient was instructed to call the office immediately if the following severe adverse effects occur:  hearing changes, easy bruising/bleeding, severe headache, or vision changes.  The patient verbalized understanding of the proper use and possible adverse effects of minocycline.  All of the patient's questions and concerns were addressed.
High Dose Vitamin A Pregnancy And Lactation Text: High dose vitamin A therapy is contraindicated during pregnancy and breast feeding.
Odomzo Counseling- I discussed with the patient the risks of Odomzo including but not limited to nausea, vomiting, diarrhea, constipation, weight loss, changes in the sense of taste, decreased appetite, muscle spasms, and hair loss.  The patient verbalized understanding of the proper use and possible adverse effects of Odomzo.  All of the patient's questions and concerns were addressed.
Tremfya Counseling: I discussed with the patient the risks of guselkumab including but not limited to immunosuppression, serious infections, worsening of inflammatory bowel disease and drug reactions.  The patient understands that monitoring is required including a PPD at baseline and must alert us or the primary physician if symptoms of infection or other concerning signs are noted.
Colchicine Pregnancy And Lactation Text: This medication is Pregnancy Category C and isn't considered safe during pregnancy. It is excreted in breast milk.
Ketoconazole Counseling:   Patient counseled regarding improving absorption with orange juice.  Adverse effects include but are not limited to breast enlargement, headache, diarrhea, nausea, upset stomach, liver function test abnormalities, taste disturbance, and stomach pain.  There is a rare possibility of liver failure that can occur when taking ketoconazole. The patient understands that monitoring of LFTs may be required, especially at baseline. The patient verbalized understanding of the proper use and possible adverse effects of ketoconazole.  All of the patient's questions and concerns were addressed.
SSKI Counseling:  I discussed with the patient the risks of SSKI including but not limited to thyroid abnormalities, metallic taste, GI upset, fever, headache, acne, arthralgias, paraesthesias, lymphadenopathy, easy bleeding, arrhythmias, and allergic reaction.
Cyclosporine Pregnancy And Lactation Text: This medication is Pregnancy Category C and it isn't know if it is safe during pregnancy. This medication is excreted in breast milk.
Topical Metronidazole Pregnancy And Lactation Text: This medication is Pregnancy Category B and considered safe during pregnancy.  It is also considered safe to use while breastfeeding.
Tetracycline Counseling: Patient counseled regarding possible photosensitivity and increased risk for sunburn.  Patient instructed to avoid sunlight, if possible.  When exposed to sunlight, patients should wear protective clothing, sunglasses, and sunscreen.  The patient was instructed to call the office immediately if the following severe adverse effects occur:  hearing changes, easy bruising/bleeding, severe headache, or vision changes.  The patient verbalized understanding of the proper use and possible adverse effects of tetracycline.  All of the patient's questions and concerns were addressed. Patient understands to avoid pregnancy while on therapy due to potential birth defects.
Carac Counseling:  I discussed with the patient the risks of Carac including but not limited to erythema, scaling, itching, weeping, crusting, and pain.
Elidel Counseling: Patient may experience a mild burning sensation during topical application. Elidel is not approved in children less than 2 years of age. There have been case reports of hematologic and skin malignancies in patients using topical calcineurin inhibitors although causality is questionable.
Hydroxychloroquine Pregnancy And Lactation Text: This medication has been shown to cause fetal harm but it isn't assigned a Pregnancy Risk Category. There are small amounts excreted in breast milk.
Use Enhanced Medication Counseling?: No
Humira Pregnancy And Lactation Text: This medication is Pregnancy Category B and is considered safe during pregnancy. It is unknown if this medication is excreted in breast milk.
Cephalexin Pregnancy And Lactation Text: This medication is Pregnancy Category B and considered safe during pregnancy.  It is also excreted in breast milk but can be used safely for shorter doses.
Birth Control Pills Pregnancy And Lactation Text: This medication should be avoided if pregnant and for the first 30 days post-partum.
Albendazole Pregnancy And Lactation Text: This medication is Pregnancy Category C and it isn't known if it is safe during pregnancy. It is also excreted in breast milk.
Klisyri Counseling:  I discussed with the patient the risks of Klisyri including but not limited to erythema, scaling, itching, weeping, crusting, and pain.
High Dose Vitamin A Counseling: Side effects reviewed, pt to contact office should one occur.
Olanzapine Pregnancy And Lactation Text: This medication is pregnancy category C.   There are no adequate and well controlled trials with olanzapine in pregnant females.  Olanzapine should be used during pregnancy only if the potential benefit justifies the potential risk to the fetus.   In a study in lactating healthy women, olanzapine was excreted in breast milk.  It is recommended that women taking olanzapine should not breast feed.
Olumiant Counseling: I discussed with the patient the risks of Olumiant therapy including but not limited to upper respiratory tract infections, shingles, cold sores, and nausea. Live vaccines should be avoided.  This medication has been linked to serious infections; higher rate of mortality; malignancy and lymphoproliferative disorders; major adverse cardiovascular events; thrombosis; gastrointestinal perforations; neutropenia; lymphopenia; anemia; liver enzyme elevations; and lipid elevations.
Qbrexza Pregnancy And Lactation Text: There is no available data on Qbrexza use in pregnant women.  There is no available data on Qbrexza use in lactation.
Libtayo Pregnancy And Lactation Text: This medication is contraindicated in pregnancy and when breast feeding.
Metronidazole Pregnancy And Lactation Text: This medication is Pregnancy Category B and considered safe during pregnancy.  It is also excreted in breast milk.
Itraconazole Pregnancy And Lactation Text: This medication is Pregnancy Category C and it isn't know if it is safe during pregnancy. It is also excreted in breast milk.
Picato Counseling:  I discussed with the patient the risks of Picato including but not limited to erythema, scaling, itching, weeping, crusting, and pain.
Cyclosporine Counseling:  I discussed with the patient the risks of cyclosporine including but not limited to hypertension, gingival hyperplasia,myelosuppression, immunosuppression, liver damage, kidney damage, neurotoxicity, lymphoma, and serious infections. The patient understands that monitoring is required including baseline blood pressure, CBC, CMP, lipid panel and uric acid, and then 1-2 times monthly CMP and blood pressure.
Propranolol Pregnancy And Lactation Text: This medication is Pregnancy Category C and it isn't known if it is safe during pregnancy. It is excreted in breast milk.
Colchicine Counseling:  Patient counseled regarding adverse effects including but not limited to stomach upset (nausea, vomiting, stomach pain, or diarrhea).  Patient instructed to limit alcohol consumption while taking this medication.  Colchicine may reduce blood counts especially with prolonged use.  The patient understands that monitoring of kidney function and blood counts may be required, especially at baseline. The patient verbalized understanding of the proper use and possible adverse effects of colchicine.  All of the patient's questions and concerns were addressed.
Benzoyl Peroxide Pregnancy And Lactation Text: This medication is Pregnancy Category C. It is unknown if benzoyl peroxide is excreted in breast milk.
Topical Steroids Counseling: I discussed with the patient that prolonged use of topical steroids can result in the increased appearance of superficial blood vessels (telangiectasias), lightening (hypopigmentation) and thinning of the skin (atrophy).  Patient understands to avoid using high potency steroids in skin folds, the groin or the face.  The patient verbalized understanding of the proper use and possible adverse effects of topical steroids.  All of the patient's questions and concerns were addressed.
Cimetidine Counseling:  I discussed with the patient the risks of Cimetidine including but not limited to gynecomastia, headache, diarrhea, nausea, drowsiness, arrhythmias, pancreatitis, skin rashes, psychosis, bone marrow suppression and kidney toxicity.
Drysol Pregnancy And Lactation Text: This medication is considered safe during pregnancy and breast feeding.
Humira Counseling:  I discussed with the patient the risks of adalimumab including but not limited to myelosuppression, immunosuppression, autoimmune hepatitis, demyelinating diseases, lymphoma, and serious infections.  The patient understands that monitoring is required including a PPD at baseline and must alert us or the primary physician if symptoms of infection or other concerning signs are noted.
Hydroxychloroquine Counseling:  I discussed with the patient that a baseline ophthalmologic exam is needed at the start of therapy and every year thereafter while on therapy. A CBC may also be warranted for monitoring.  The side effects of this medication were discussed with the patient, including but not limited to agranulocytosis, aplastic anemia, seizures, rashes, retinopathy, and liver toxicity. Patient instructed to call the office should any adverse effect occur.  The patient verbalized understanding of the proper use and possible adverse effects of Plaquenil.  All the patient's questions and concerns were addressed.
Valtrex Pregnancy And Lactation Text: this medication is Pregnancy Category B and is considered safe during pregnancy. This medication is not directly found in breast milk but it's metabolite acyclovir is present.
Tazorac Counseling:  Patient advised that medication is irritating and drying.  Patient may need to apply sparingly and wash off after an hour before eventually leaving it on overnight.  The patient verbalized understanding of the proper use and possible adverse effects of tazorac.  All of the patient's questions and concerns were addressed.
Cephalexin Counseling: I counseled the patient regarding use of cephalexin as an antibiotic for prophylactic and/or therapeutic purposes. Cephalexin (commonly prescribed under brand name Keflex) is a cephalosporin antibiotic which is active against numerous classes of bacteria, including most skin bacteria. Side effects may include nausea, diarrhea, gastrointestinal upset, rash, hives, yeast infections, and in rare cases, hepatitis, kidney disease, seizures, fever, confusion, neurologic symptoms, and others. Patients with severe allergies to penicillin medications are cautioned that there is about a 10% incidence of cross-reactivity with cephalosporins. When possible, patients with penicillin allergies should use alternatives to cephalosporins for antibiotic therapy.
Ivermectin Counseling:  Patient instructed to take medication on an empty stomach with a full glass of water.  Patient informed of potential adverse effects including but not limited to nausea, diarrhea, dizziness, itching, and swelling of the extremities or lymph nodes.  The patient verbalized understanding of the proper use and possible adverse effects of ivermectin.  All of the patient's questions and concerns were addressed.
Birth Control Pills Counseling: Birth Control Pill Counseling: I discussed with the patient the potential side effects of OCPs including but not limited to increased risk of stroke, heart attack, thrombophlebitis, deep venous thrombosis, hepatic adenomas, breast changes, GI upset, headaches, and depression.  The patient verbalized understanding of the proper use and possible adverse effects of OCPs. All of the patient's questions and concerns were addressed.
Olanzapine Counseling- I discussed with the patient the common side effects of olanzapine including but are not limited to: lack of energy, dry mouth, increased appetite, sleepiness, tremor, constipation, dizziness, changes in behavior, or restlessness.  Explained that teenagers are more likely to experience headaches, abdominal pain, pain in the arms or legs, tiredness, and sleepiness.  Serious side effects include but are not limited: increased risk of death in elderly patients who are confused, have memory loss, or dementia-related psychosis; hyperglycemia; increased cholesterol and triglycerides; and weight gain.
Siliq Counseling:  I discussed with the patient the risks of Siliq including but not limited to new or worsening depression, suicidal thoughts and behavior, immunosuppression, malignancy, posterior leukoencephalopathy syndrome, and serious infections.  The patient understands that monitoring is required including a PPD at baseline and must alert us or the primary physician if symptoms of infection or other concerning signs are noted. There is also a special program designed to monitor depression which is required with Siliq.
Wartpeel Counseling:  I discussed with the patient the risks of Wartpeel including but not limited to erythema, scaling, itching, weeping, crusting, and pain.
Zyclara Counseling:  I discussed with the patient the risks of imiquimod including but not limited to erythema, scaling, itching, weeping, crusting, and pain.  Patient understands that the inflammatory response to imiquimod is variable from person to person and was educated regarded proper titration schedule.  If flu-like symptoms develop, patient knows to discontinue the medication and contact us.
Metronidazole Counseling:  I discussed with the patient the risks of metronidazole including but not limited to seizures, nausea/vomiting, a metallic taste in the mouth, nausea/vomiting and severe allergy.
Cibinqo Pregnancy And Lactation Text: It is unknown if this medication will adversely affect pregnancy or breast feeding.  You should not take this medication if you are currently pregnant or planning a pregnancy or while breastfeeding.
Isotretinoin Pregnancy And Lactation Text: This medication is Pregnancy Category X and is considered extremely dangerous during pregnancy. It is unknown if it is excreted in breast milk.
Rhofade Counseling: Rhofade is a topical medication which can decrease superficial blood flow where applied. Side effects are uncommon and include stinging, redness and allergic reactions.
Libtayo Counseling- I discussed with the patient the risks of Libtayo including but not limited to nausea, vomiting, diarrhea, and bone or muscle pain.  The patient verbalized understanding of the proper use and possible adverse effects of Libtayo.  All of the patient's questions and concerns were addressed.
Dupixent Counseling: I discussed with the patient the risks of dupilumab including but not limited to eye infection and irritation, cold sores, injection site reactions, worsening of asthma, allergic reactions and increased risk of parasitic infection.  Live vaccines should be avoided while taking dupilumab. Dupilumab will also interact with certain medications such as warfarin and cyclosporine. The patient understands that monitoring is required and they must alert us or the primary physician if symptoms of infection or other concerning signs are noted.
Itraconazole Counseling:  I discussed with the patient the risks of itraconazole including but not limited to liver damage, nausea/vomiting, neuropathy, and severe allergy.  The patient understands that this medication is best absorbed when taken with acidic beverages such as non-diet cola or ginger ale.  The patient understands that monitoring is required including baseline LFTs and repeat LFTs at intervals.  The patient understands that they are to contact us or the primary physician if concerning signs are noted.
Opzelura Pregnancy And Lactation Text: There is insufficient data to evaluate drug-associated risk for major birth defects, miscarriage, or other adverse maternal or fetal outcomes.  There is a pregnancy registry that monitors pregnancy outcomes in pregnant persons exposed to the medication during pregnancy.  It is unknown if this medication is excreted in breast milk.  Do not breastfeed during treatment and for about 4 weeks after the last dose.
Xelsarahz Pregnancy And Lactation Text: This medication is Pregnancy Category D and is not considered safe during pregnancy.  The risk during breast feeding is also uncertain.
Propranolol Counseling:  I discussed with the patient the risks of propranolol including but not limited to low heart rate, low blood pressure, low blood sugar, restlessness and increased cold sensitivity. They should call the office if they experience any of these side effects.
Cyclophosphamide Pregnancy And Lactation Text: This medication is Pregnancy Category D and it isn't considered safe during pregnancy. This medication is excreted in breast milk.
Taltz Counseling: I discussed with the patient the risks of ixekizumab including but not limited to immunosuppression, serious infections, worsening of inflammatory bowel disease and drug reactions.  The patient understands that monitoring is required including a PPD at baseline and must alert us or the primary physician if symptoms of infection or other concerning signs are noted.
Sarecycline Counseling: Patient advised regarding possible photosensitivity and discoloration of the teeth, skin, lips, tongue and gums.  Patient instructed to avoid sunlight, if possible.  When exposed to sunlight, patients should wear protective clothing, sunglasses, and sunscreen.  The patient was instructed to call the office immediately if the following severe adverse effects occur:  hearing changes, easy bruising/bleeding, severe headache, or vision changes.  The patient verbalized understanding of the proper use and possible adverse effects of sarecycline.  All of the patient's questions and concerns were addressed.
Benzoyl Peroxide Counseling: Patient counseled that medicine may cause skin irritation and bleach clothing.  In the event of skin irritation, the patient was advised to reduce the amount of the drug applied or use it less frequently.   The patient verbalized understanding of the proper use and possible adverse effects of benzoyl peroxide.  All of the patient's questions and concerns were addressed.
Drysol Counseling:  I discussed with the patient the risks of drysol/aluminum chloride including but not limited to skin rash, itching, irritation, burning.
Valtrex Counseling: I discussed with the patient the risks of valacyclovir including but not limited to kidney damage, nausea, vomiting and severe allergy.  The patient understands that if the infection seems to be worsening or is not improving, they are to call.
Topical Steroids Applications Pregnancy And Lactation Text: Most topical steroids are considered safe to use during pregnancy and lactation.  Any topical steroid applied to the breast or nipple should be washed off before breastfeeding.
Glycopyrrolate Pregnancy And Lactation Text: This medication is Pregnancy Category B and is considered safe during pregnancy. It is unknown if it is excreted breast milk.
Tazorac Pregnancy And Lactation Text: This medication is not safe during pregnancy. It is unknown if this medication is excreted in breast milk.
Imiquimod Counseling:  I discussed with the patient the risks of imiquimod including but not limited to erythema, scaling, itching, weeping, crusting, and pain.  Patient understands that the inflammatory response to imiquimod is variable from person to person and was educated regarded proper titration schedule.  If flu-like symptoms develop, patient knows to discontinue the medication and contact us.
Bactrim Pregnancy And Lactation Text: This medication is Pregnancy Category D and is known to cause fetal risk.  It is also excreted in breast milk.
Cibinqo Counseling: I discussed with the patient the risks of Cibinqo therapy including but not limited to common cold, nausea, headache, cold sores, increased blood CPK levels, dizziness, UTIs, fatigue, acne, and vomitting. Live vaccines should be avoided.  This medication has been linked to serious infections; higher rate of mortality; malignancy and lymphoproliferative disorders; major adverse cardiovascular events; thrombosis; thrombocytopenia and lymphopenia; lipid elevations; and retinal detachment.
Nsaids Pregnancy And Lactation Text: These medications are considered safe up to 30 weeks gestation. It is excreted in breast milk.
Topical Sulfur Applications Pregnancy And Lactation Text: This medication is Pregnancy Category C and has an unknown safety profile during pregnancy. It is unknown if this topical medication is excreted in breast milk.
Rhofade Pregnancy And Lactation Text: This medication has not been assigned a Pregnancy Risk Category. It is unknown if the medication is excreted in breast milk.
Finasteride Pregnancy And Lactation Text: This medication is absolutely contraindicated during pregnancy. It is unknown if it is excreted in breast milk.
Zoryve Pregnancy And Lactation Text: It is unknown if this medication can cause problems during pregnancy and breastfeeding.
Rituxan Pregnancy And Lactation Text: This medication is Pregnancy Category C and it isn't know if it is safe during pregnancy. It is unknown if this medication is excreted in breast milk but similar antibodies are known to be excreted.
Erythromycin Pregnancy And Lactation Text: This medication is Pregnancy Category B and is considered safe during pregnancy. It is also excreted in breast milk.
Isotretinoin Counseling: Patient should get monthly blood tests, not donate blood, not drive at night if vision affected, not share medication, and not undergo elective surgery for 6 months after tx completed. Side effects reviewed, pt to contact office should one occur.
Clofazimine Counseling:  I discussed with the patient the risks of clofazimine including but not limited to skin and eye pigmentation, liver damage, nausea/vomiting, gastrointestinal bleeding and allergy.
Griseofulvin Pregnancy And Lactation Text: This medication is Pregnancy Category X and is known to cause serious birth defects. It is unknown if this medication is excreted in breast milk but breast feeding should be avoided.
Opzelura Counseling:  I discussed with the patient the risks of Opzelura including but not limited to nasopharngitis, bronchitis, ear infection, eosinophila, hives, diarrhea, folliculitis, tonsillitis, and rhinorrhea.  Taken orally, this medication has been linked to serious infections; higher rate of mortality; malignancy and lymphoproliferative disorders; major adverse cardiovascular events; thrombosis; thrombocytopenia, anemia, and neutropenia; and lipid elevations.
Oxybutynin Pregnancy And Lactation Text: This medication is Pregnancy Category B and is considered safe during pregnancy. It is unknown if it is excreted in breast milk.
Cyclophosphamide Counseling:  I discussed with the patient the risks of cyclophosphamide including but not limited to hair loss, hormonal abnormalities, decreased fertility, abdominal pain, diarrhea, nausea and vomiting, bone marrow suppression and infection. The patient understands that monitoring is required while taking this medication.
Xeljanz Counseling: I discussed with the patient the risks of Xeljanz therapy including increased risk of infection, liver issues, headache, diarrhea, or cold symptoms. Live vaccines should be avoided. They were instructed to call if they have any problems.
Rifampin Pregnancy And Lactation Text: This medication is Pregnancy Category C and it isn't know if it is safe during pregnancy. It is also excreted in breast milk and should not be used if you are breast feeding.
Topical Sulfur Applications Counseling: Topical Sulfur Counseling: Patient counseled that this medication may cause skin irritation or allergic reactions.  In the event of skin irritation, the patient was advised to reduce the amount of the drug applied or use it less frequently.   The patient verbalized understanding of the proper use and possible adverse effects of topical sulfur application.  All of the patient's questions and concerns were addressed.
Doxepin Counseling:  Patient advised that the medication is sedating and not to drive a car after taking this medication. Patient informed of potential adverse effects including but not limited to dry mouth, urinary retention, and blurry vision.  The patient verbalized understanding of the proper use and possible adverse effects of doxepin.  All of the patient's questions and concerns were addressed.
Glycopyrrolate Counseling:  I discussed with the patient the risks of glycopyrrolate including but not limited to skin rash, drowsiness, dry mouth, difficulty urinating, and blurred vision.
Tranexamic Acid Pregnancy And Lactation Text: It is unknown if this medication is safe during pregnancy or breast feeding.
Topical Clindamycin Counseling: Patient counseled that this medication may cause skin irritation or allergic reactions.  In the event of skin irritation, the patient was advised to reduce the amount of the drug applied or use it less frequently.   The patient verbalized understanding of the proper use and possible adverse effects of clindamycin.  All of the patient's questions and concerns were addressed.
Enbrel Counseling:  I discussed with the patient the risks of etanercept including but not limited to myelosuppression, immunosuppression, autoimmune hepatitis, demyelinating diseases, lymphoma, and infections.  The patient understands that monitoring is required including a PPD at baseline and must alert us or the primary physician if symptoms of infection or other concerning signs are noted.
Bactrim Counseling:  I discussed with the patient the risks of sulfa antibiotics including but not limited to GI upset, allergic reaction, drug rash, diarrhea, dizziness, photosensitivity, and yeast infections.  Rarely, more serious reactions can occur including but not limited to aplastic anemia, agranulocytosis, methemoglobinemia, blood dyscrasias, liver or kidney failure, lung infiltrates or desquamative/blistering drug rashes.
Zoryve Counseling:  I discussed with the patient that Zoryve is not for use in the eyes, mouth or vagina. The most commonly reported side effects include diarrhea, headache, insomnia, application site pain, upper respiratory tract infections, and urinary tract infections.  All of the patient's questions and concerns were addressed.
Hydroquinone Pregnancy And Lactation Text: This medication has not been assigned a Pregnancy Risk Category but animal studies failed to show danger with the topical medication. It is unknown if the medication is excreted in breast milk.
Bexarotene Pregnancy And Lactation Text: This medication is Pregnancy Category X and should not be given to women who are pregnant or may become pregnant. This medication should not be used if you are breast feeding.
Rituxan Counseling:  I discussed with the patient the risks of Rituxan infusions. Side effects can include infusion reactions, severe drug rashes including mucocutaneous reactions, reactivation of latent hepatitis and other infections and rarely progressive multifocal leukoencephalopathy.  All of the patient's questions and concerns were addressed.
Nsaids Counseling: NSAID Counseling: I discussed with the patient that NSAIDs should be taken with food. Prolonged use of NSAIDs can result in the development of stomach ulcers.  Patient advised to stop taking NSAIDs if abdominal pain occurs.  The patient verbalized understanding of the proper use and possible adverse effects of NSAIDs.  All of the patient's questions and concerns were addressed.
Finasteride Male Counseling: Finasteride Counseling:  I discussed with the patient the risks of use of finasteride including but not limited to decreased libido, decreased ejaculate volume, gynecomastia, and depression. Women should not handle medication.  All of the patient's questions and concerns were addressed.
Solaraze Counseling:  I discussed with the patient the risks of Solaraze including but not limited to erythema, scaling, itching, weeping, crusting, and pain.
Erivedge Counseling- I discussed with the patient the risks of Erivedge including but not limited to nausea, vomiting, diarrhea, constipation, weight loss, changes in the sense of taste, decreased appetite, muscle spasms, and hair loss.  The patient verbalized understanding of the proper use and possible adverse effects of Erivedge.  All of the patient's questions and concerns were addressed.
Griseofulvin Counseling:  I discussed with the patient the risks of griseofulvin including but not limited to photosensitivity, cytopenia, liver damage, nausea/vomiting and severe allergy.  The patient understands that this medication is best absorbed when taken with a fatty meal (e.g., ice cream or french fries).
Erythromycin Counseling:  I discussed with the patient the risks of erythromycin including but not limited to GI upset, allergic reaction, drug rash, diarrhea, increase in liver enzymes, and yeast infections.
Cellcept Pregnancy And Lactation Text: This medication is Pregnancy Category D and isn't considered safe during pregnancy. It is unknown if this medication is excreted in breast milk.
Oxybutynin Counseling:  I discussed with the patient the risks of oxybutynin including but not limited to skin rash, drowsiness, dry mouth, difficulty urinating, and blurred vision.
Stelara Counseling:  I discussed with the patient the risks of ustekinumab including but not limited to immunosuppression, malignancy, posterior leukoencephalopathy syndrome, and serious infections.  The patient understands that monitoring is required including a PPD at baseline and must alert us or the primary physician if symptoms of infection or other concerning signs are noted.
Rifampin Counseling: I discussed with the patient the risks of rifampin including but not limited to liver damage, kidney damage, red-orange body fluids, nausea/vomiting and severe allergy.
Sotyktu Pregnancy And Lactation Text: There is insufficient data to evaluate whether or not Sotyktu is safe to use during pregnancy.? ?It is not known if Sotyktu passes into breast milk and whether or not it is safe to use when breastfeeding.??
Azelaic Acid Counseling: Patient counseled that medicine may cause skin irritation and to avoid applying near the eyes.  In the event of skin irritation, the patient was advised to reduce the amount of the drug applied or use it less frequently.   The patient verbalized understanding of the proper use and possible adverse effects of azelaic acid.  All of the patient's questions and concerns were addressed.
Adbry Counseling: I discussed with the patient the risks of tralokinumab including but not limited to eye infection and irritation, cold sores, injection site reactions, worsening of asthma, allergic reactions and increased risk of parasitic infection.  Live vaccines should be avoided while taking tralokinumab. The patient understands that monitoring is required and they must alert us or the primary physician if symptoms of infection or other concerning signs are noted.
5-Fu Counseling: 5-Fluorouracil Counseling:  I discussed with the patient the risks of 5-fluorouracil including but not limited to erythema, scaling, itching, weeping, crusting, and pain.
Tranexamic Acid Counseling:  Patient advised of the small risk of bleeding problems with tranexamic acid. They were also instructed to call if they developed any nausea, vomiting or diarrhea. All of the patient's questions and concerns were addressed.
Dupixent Pregnancy And Lactation Text: This medication likely crosses the placenta but the risk for the fetus is uncertain. This medication is excreted in breast milk.
Doxepin Pregnancy And Lactation Text: This medication is Pregnancy Category C and it isn't known if it is safe during pregnancy. It is also excreted in breast milk and breast feeding isn't recommended.
Hydroquinone Counseling:  Patient advised that medication may result in skin irritation, lightening (hypopigmentation), dryness, and burning.  In the event of skin irritation, the patient was advised to reduce the amount of the drug applied or use it less frequently.  Rarely, spots that are treated with hydroquinone can become darker (pseudoochronosis).  Should this occur, patient instructed to stop medication and call the office. The patient verbalized understanding of the proper use and possible adverse effects of hydroquinone.  All of the patient's questions and concerns were addressed.
Azithromycin Pregnancy And Lactation Text: This medication is considered safe during pregnancy and is also secreted in breast milk.
Niacinamide Pregnancy And Lactation Text: These medications are considered safe during pregnancy.
Dutasteride Pregnancy And Lactation Text: This medication is absolutely contraindicated in women, especially during pregnancy and breast feeding. Feminization of male fetuses is possible if taking while pregnant.
Doxycycline Pregnancy And Lactation Text: This medication is Pregnancy Category D and not consider safe during pregnancy. It is also excreted in breast milk but is considered safe for shorter treatment courses.
Bexarotene Counseling:  I discussed with the patient the risks of bexarotene including but not limited to hair loss, dry lips/skin/eyes, liver abnormalities, hyperlipidemia, pancreatitis, depression/suicidal ideation, photosensitivity, drug rash/allergic reactions, hypothyroidism, anemia, leukopenia, infection, cataracts, and teratogenicity.  Patient understands that they will need regular blood tests to check lipid profile, liver function tests, white blood cell count, thyroid function tests and pregnancy test if applicable.
Cantharidin Pregnancy And Lactation Text: This medication has not been proven safe during pregnancy. It is unknown if this medication is excreted in breast milk.
Solaraze Pregnancy And Lactation Text: This medication is Pregnancy Category B and is considered safe. There is some data to suggest avoiding during the third trimester. It is unknown if this medication is excreted in breast milk.
Mirvaso Counseling: Mirvaso is a topical medication which can decrease superficial blood flow where applied. Side effects are uncommon and include stinging, redness and allergic reactions.
Sotyktu Counseling:  I discussed the most common side effects of Sotyktu including: common cold, sore throat, sinus infections, cold sores, canker sores, folliculitis, and acne.? I also discussed more serious side effects of Sotyktu including but not limited to: serious allergic reactions; increased risk for infections such as TB; cancers such as lymphomas; rhabdomyolysis and elevated CPK; and elevated triglycerides and liver enzymes.?
Azathioprine Counseling:  I discussed with the patient the risks of azathioprine including but not limited to myelosuppression, immunosuppression, hepatotoxicity, lymphoma, and infections.  The patient understands that monitoring is required including baseline LFTs, Creatinine, possible TPMP genotyping and weekly CBCs for the first month and then every 2 weeks thereafter.  The patient verbalized understanding of the proper use and possible adverse effects of azathioprine.  All of the patient's questions and concerns were addressed.
Otezla Pregnancy And Lactation Text: This medication is Pregnancy Category C and it isn't known if it is safe during pregnancy. It is unknown if it is excreted in breast milk.
Cellcept Counseling:  I discussed with the patient the risks of mycophenolate mofetil including but not limited to infection/immunosuppression, GI upset, hypokalemia, hypercholesterolemia, bone marrow suppression, lymphoproliferative disorders, malignancy, GI ulceration/bleed/perforation, colitis, interstitial lung disease, kidney failure, progressive multifocal leukoencephalopathy, and birth defects.  The patient understands that monitoring is required including a baseline creatinine and regular CBC testing. In addition, patient must alert us immediately if symptoms of infection or other concerning signs are noted.
Adbry Pregnancy And Lactation Text: It is unknown if this medication will adversely affect pregnancy or breast feeding.
Arava Counseling:  Patient counseled regarding adverse effects of Arava including but not limited to nausea, vomiting, abnormalities in liver function tests. Patients may develop mouth sores, rash, diarrhea, and abnormalities in blood counts. The patient understands that monitoring is required including LFTs and blood counts.  There is a rare possibility of scarring of the liver and lung problems that can occur when taking methotrexate. Persistent nausea, loss of appetite, pale stools, dark urine, cough, and shortness of breath should be reported immediately. Patient advised to discontinue Arava treatment and consult with a physician prior to attempting conception. The patient will have to undergo a treatment to eliminate Arava from the body prior to conception.
Aklief Pregnancy And Lactation Text: It is unknown if this medication is safe to use during pregnancy.  It is unknown if this medication is excreted in breast milk.  Breastfeeding women should use the topical cream on the smallest area of the skin for the shortest time needed while breastfeeding.  Do not apply to nipple and areola.
Xolair Pregnancy And Lactation Text: This medication is Pregnancy Category B and is considered safe during pregnancy. This medication is excreted in breast milk.
Gabapentin Counseling: I discussed with the patient the risks of gabapentin including but not limited to dizziness, somnolence, fatigue and ataxia.
Cantharidin Counseling:  I discussed with the patient the risks of Cantharidin including but not limited to pain, redness, burning, itching, and blistering.
Prednisone Counseling:  I discussed with the patient the risks of prolonged use of prednisone including but not limited to weight gain, insomnia, osteoporosis, mood changes, diabetes, susceptibility to infection, glaucoma and high blood pressure.  In cases where prednisone use is prolonged, patients should be monitored with blood pressure checks, serum glucose levels and an eye exam.  Additionally, the patient may need to be placed on GI prophylaxis, PCP prophylaxis, and calcium and vitamin D supplementation and/or a bisphosphonate.  The patient verbalized understanding of the proper use and the possible adverse effects of prednisone.  All of the patient's questions and concerns were addressed.
Hydroxyzine Counseling: Patient advised that the medication is sedating and not to drive a car after taking this medication.  Patient informed of potential adverse effects including but not limited to dry mouth, urinary retention, and blurry vision.  The patient verbalized understanding of the proper use and possible adverse effects of hydroxyzine.  All of the patient's questions and concerns were addressed.
Topical Ketoconazole Counseling: Patient counseled that this medication may cause skin irritation or allergic reactions.  In the event of skin irritation, the patient was advised to reduce the amount of the drug applied or use it less frequently.   The patient verbalized understanding of the proper use and possible adverse effects of ketoconazole.  All of the patient's questions and concerns were addressed.
Azithromycin Counseling:  I discussed with the patient the risks of azithromycin including but not limited to GI upset, allergic reaction, drug rash, diarrhea, and yeast infections.
Niacinamide Counseling: I recommended taking niacin or niacinamide, also know as vitamin B3, twice daily. Recent evidence suggests that taking vitamin B3 (500 mg twice daily) can reduce the risk of actinic keratoses and non-melanoma skin cancers. Side effects of vitamin B3 include flushing and headache.
Dutasteride Male Counseling: Dustasteride Counseling:  I discussed with the patient the risks of use of dutasteride including but not limited to decreased libido, decreased ejaculate volume, and gynecomastia. Women who can become pregnant should not handle medication.  All of the patient's questions and concerns were addressed.
Soolantra Counseling: I discussed with the patients the risks of topial Soolantra. This is a medicine which decreases the number of mites and inflammation in the skin. You experience burning, stinging, eye irritation or allergic reactions.  Please call our office if you develop any problems from using this medication.
VTAMA Counseling: I discussed with the patient that VTAMA is not for use in the eyes, mouth or mouth. They should call the office if they develop any signs of allergic reactions to VTAMA. The patient verbalized understanding of the proper use and possible adverse effects of VTAMA.  All of the patient's questions and concerns were addressed.
Infliximab Counseling:  I discussed with the patient the risks of infliximab including but not limited to myelosuppression, immunosuppression, autoimmune hepatitis, demyelinating diseases, lymphoma, and serious infections.  The patient understands that monitoring is required including a PPD at baseline and must alert us or the primary physician if symptoms of infection or other concerning signs are noted.
Doxycycline Counseling:  Patient counseled regarding possible photosensitivity and increased risk for sunburn.  Patient instructed to avoid sunlight, if possible.  When exposed to sunlight, patients should wear protective clothing, sunglasses, and sunscreen.  The patient was instructed to call the office immediately if the following severe adverse effects occur:  hearing changes, easy bruising/bleeding, severe headache, or vision changes.  The patient verbalized understanding of the proper use and possible adverse effects of doxycycline.  All of the patient's questions and concerns were addressed.
Acitretin Pregnancy And Lactation Text: This medication is Pregnancy Category X and should not be given to women who are pregnant or may become pregnant in the future. This medication is excreted in breast milk.
Fluconazole Counseling:  Patient counseled regarding adverse effects of fluconazole including but not limited to headache, diarrhea, nausea, upset stomach, liver function test abnormalities, taste disturbance, and stomach pain.  There is a rare possibility of liver failure that can occur when taking fluconazole.  The patient understands that monitoring of LFTs and kidney function test may be required, especially at baseline. The patient verbalized understanding of the proper use and possible adverse effects of fluconazole.  All of the patient's questions and concerns were addressed.
Detail Level: Zone
Calcipotriene Pregnancy And Lactation Text: The use of this medication during pregnancy or lactation is not recommended as there is insufficient data.
Skyrizi Counseling: I discussed with the patient the risks of risankizumab-rzaa including but not limited to immunosuppression, and serious infections.  The patient understands that monitoring is required including a PPD at baseline and must alert us or the primary physician if symptoms of infection or other concerning signs are noted.
Otezla Counseling: The side effects of Otezla were discussed with the patient, including but not limited to worsening or new depression, weight loss, diarrhea, nausea, upper respiratory tract infection, and headache. Patient instructed to call the office should any adverse effect occur.  The patient verbalized understanding of the proper use and possible adverse effects of Otezla.  All the patient's questions and concerns were addressed.
Rinvoq Pregnancy And Lactation Text: Based on animal studies, Rinvoq may cause embryo-fetal harm when administered to pregnant women.  The medication should not be used in pregnancy.  Breastfeeding is not recommended during treatment and for 6 days after the last dose.
Cimzia Counseling:  I discussed with the patient the risks of Cimzia including but not limited to immunosuppression, allergic reactions and infections.  The patient understands that monitoring is required including a PPD at baseline and must alert us or the primary physician if symptoms of infection or other concerning signs are noted.
Quinolones Counseling:  I discussed with the patient the risks of fluoroquinolones including but not limited to GI upset, allergic reaction, drug rash, diarrhea, dizziness, photosensitivity, yeast infections, liver function test abnormalities, tendonitis/tendon rupture.
Terbinafine Counseling: Patient counseling regarding adverse effects of terbinafine including but not limited to headache, diarrhea, rash, upset stomach, liver function test abnormalities, itching, taste/smell disturbance, nausea, abdominal pain, and flatulence.  There is a rare possibility of liver failure that can occur when taking terbinafine.  The patient understands that a baseline LFT and kidney function test may be required. The patient verbalized understanding of the proper use and possible adverse effects of terbinafine.  All of the patient's questions and concerns were addressed.
Aklief counseling:  Patient advised to apply a pea-sized amount only at bedtime and wait 30 minutes after washing their face before applying.  If too drying, patient may add a non-comedogenic moisturizer.  The most commonly reported side effects including irritation, redness, scaling, dryness, stinging, burning, itching, and increased risk of sunburn.  The patient verbalized understanding of the proper use and possible adverse effects of retinoids.  All of the patient's questions and concerns were addressed.
Protopic Pregnancy And Lactation Text: This medication is Pregnancy Category C. It is unknown if this medication is excreted in breast milk when applied topically.
Methotrexate Pregnancy And Lactation Text: This medication is Pregnancy Category X and is known to cause fetal harm. This medication is excreted in breast milk.
Thalidomide Counseling: I discussed with the patient the risks of thalidomide including but not limited to birth defects, anxiety, weakness, chest pain, dizziness, cough and severe allergy.
Xolair Counseling:  Patient informed of potential adverse effects including but not limited to fever, muscle aches, rash and allergic reactions.  The patient verbalized understanding of the proper use and possible adverse effects of Xolair.  All of the patient's questions and concerns were addressed.
Dapsone Pregnancy And Lactation Text: This medication is Pregnancy Category C and is not considered safe during pregnancy or breast feeding.
Calcipotriene Counseling:  I discussed with the patient the risks of calcipotriene including but not limited to erythema, scaling, itching, and irritation.
Opioid Pregnancy And Lactation Text: These medications can lead to premature delivery and should be avoided during pregnancy. These medications are also present in breast milk in small amounts.
Hydroxyzine Pregnancy And Lactation Text: This medication is not safe during pregnancy and should not be taken. It is also excreted in breast milk and breast feeding isn't recommended.
Winlevi Pregnancy And Lactation Text: This medication is considered safe during pregnancy and breastfeeding.
Acitretin Counseling:  I discussed with the patient the risks of acitretin including but not limited to hair loss, dry lips/skin/eyes, liver damage, hyperlipidemia, depression/suicidal ideation, photosensitivity.  Serious rare side effects can include but are not limited to pancreatitis, pseudotumor cerebri, bony changes, clot formation/stroke/heart attack.  Patient understands that alcohol is contraindicated since it can result in liver toxicity and significantly prolong the elimination of the drug by many years.
Eucrisa Counseling: Patient may experience a mild burning sensation during topical application. Eucrisa is not approved in children less than 2 years of age.

## 2023-12-06 ENCOUNTER — TELEPHONE (OUTPATIENT)
Dept: CARDIOLOGY | Facility: MEDICAL CENTER | Age: 63
End: 2023-12-06
Payer: COMMERCIAL

## 2023-12-06 NOTE — LETTER
PROCEDURE/SURGERY CLEARANCE FORM      Encounter Date: 12/6/2023    Patient: Molly Jesus  YOB: 1960    CARDIOLOGIST:  Diamond Tomlin M.D.    REFERRING DOCTOR:  No ref. provider found    The above patient is cleared to have the following procedure/surgery: Colonoscopy with Deep Sedation                                             PROCEDURE/SURGERY CLEARANCE FORM    Date: 12/8/2023   Patient Name: Molly Jesus    Dear Surgeon or Proceduralist,      Thank you for your request for cardiac stratification of our mutual patient Molly Jesus 1960. We have reviewed their [facility] records; and to the best of our understanding this patient has not had stenting, ablation, cardiothoracic surgery or hospitalization for cardiovascular reasons in the past 6 months.  Molly Jesus has been seen within the past 18 months and is considered to have non-modifiable cardiac risk for this low-risk procedure/surgery. They may proceed from a cardiovascular standpoint and may hold their antiplatelet/anticoagulation as briefly as possible. Please have patient resume this medication when hemodynamically stable to do so.     Aspirin or Prasugrel   - hold 7 days prior to procedure/surgery, resume when hemodynamically stable      Clopidrogrel or Ticagrelor  - hold 7 days for all neurological procedures, hold 5 days prior to all other procedure/surgery,  resume when hemodynamically stable     Warfarin - hold 7 days for all neurological procedures, hold 5 days prior to all other procedure/surgery and coordinate with [facility] ([phone]) INR testing and dose management.      Pradaxa/Xarelto/Eliquis/Savesya - hold 1 day prior to procedure for low bleeding risk procedure, 2 days for high bleeding risk procedure, or consider holding 3 days or longer for patients with reduced kidney function (CrCl <30mL/min) or spinal/cranial surgeries/procedures.      If they have a  mechanical heart valve, please coordinate with [facility] ([phone]) the proper management of their anticoagulant in the periprocedural or perioperative period.      Some patients have higher risk for cardiovascular complications or holding medication. If our patient has had prior complications of holding antiplatelet or anticoagulants in the past and we have seen them after these events, we have addressed these concerns with the patient. They are at an unknown degree of increased risk for recurrent complication.  You may hold anticoagulation/antiplatelets for the procedure or surgery if the benefits of the procedure or surgery outweigh this nonmodifiable risk.      If Molly Jesus 1960 has new symptoms of heart failure decompensation, unstable arrythmia, or angina please reach out and we will assess the patient.      If you have other patient-specific concerns, please feel free to reach out to the patient's cardiologist directly at [phone].     Thank you,       [facility]       Electronically signed         MD Sandy Tomlin M.D.

## 2023-12-06 NOTE — TELEPHONE ENCOUNTER
Last OV: 10/14/2022 with TT  Proposed Surgery: Colonoscopy with Deep Sedation   Surgery Date: 12/22/2023  Requesting Office Name: [facility]   Fax Number: [phone]  Preference of Location (default is surgery center unless specified by Cardiologist or ERICKA)  Prior Clearance Addressed: Yes       Anticoags/Antiplatelets: Apixaban   Outstanding Cardiac Imaging : Yes  Other US-VEIN ABLATION VENASEAL UNILATERAL LEFT [[id number]]  Clearance to provider to review  Stent, Cardiac Devices, or Catheterization: No  Ablation, TAVR/Valve (including open heart), Cardioversion: No  Recent Cardiac Hospitalization: No            When: N/A  History (cardiac history):   Past Medical History:   Diagnosis Date    Bipolar 1 disorder (HCC)     Carpal tunnel syndrome     GERD (gastroesophageal reflux disease)     Hiatal hernia     HLD (hyperlipidemia) 6/17/2015    HLD (hyperlipidemia) 6/17/2015    Hypothyroid     Osteopenia     Ulcerative colitis (HCC)              Surgical Clearance Letter Sent: NO. Provider to advise.   **Scan clearance request letter into [facility].**

## 2024-05-21 ENCOUNTER — APPOINTMENT (RX ONLY)
Dept: URBAN - METROPOLITAN AREA CLINIC 35 | Facility: CLINIC | Age: 64
Setting detail: DERMATOLOGY
End: 2024-05-21

## 2024-05-21 DIAGNOSIS — D22 MELANOCYTIC NEVI: ICD-10-CM

## 2024-05-21 DIAGNOSIS — Z71.89 OTHER SPECIFIED COUNSELING: ICD-10-CM

## 2024-05-21 DIAGNOSIS — L82.1 OTHER SEBORRHEIC KERATOSIS: ICD-10-CM

## 2024-05-21 DIAGNOSIS — L21.8 OTHER SEBORRHEIC DERMATITIS: ICD-10-CM

## 2024-05-21 DIAGNOSIS — D18.0 HEMANGIOMA: ICD-10-CM

## 2024-05-21 DIAGNOSIS — Z85.828 PERSONAL HISTORY OF OTHER MALIGNANT NEOPLASM OF SKIN: ICD-10-CM

## 2024-05-21 DIAGNOSIS — L82.0 INFLAMED SEBORRHEIC KERATOSIS: ICD-10-CM

## 2024-05-21 DIAGNOSIS — L81.4 OTHER MELANIN HYPERPIGMENTATION: ICD-10-CM

## 2024-05-21 PROBLEM — D18.01 HEMANGIOMA OF SKIN AND SUBCUTANEOUS TISSUE: Status: ACTIVE | Noted: 2024-05-21

## 2024-05-21 PROBLEM — D22.5 MELANOCYTIC NEVI OF TRUNK: Status: ACTIVE | Noted: 2024-05-21

## 2024-05-21 PROBLEM — D22.72 MELANOCYTIC NEVI OF LEFT LOWER LIMB, INCLUDING HIP: Status: ACTIVE | Noted: 2024-05-21

## 2024-05-21 PROBLEM — D48.5 NEOPLASM OF UNCERTAIN BEHAVIOR OF SKIN: Status: ACTIVE | Noted: 2024-05-21

## 2024-05-21 PROCEDURE — ? SURGICAL DECISION MAKING

## 2024-05-21 PROCEDURE — 99214 OFFICE O/P EST MOD 30 MIN: CPT | Mod: 25

## 2024-05-21 PROCEDURE — ? COUNSELING

## 2024-05-21 PROCEDURE — ? LIQUID NITROGEN

## 2024-05-21 PROCEDURE — ? BIOPSY BY SHAVE METHOD

## 2024-05-21 PROCEDURE — ? OBSERVATION AND MEASURE

## 2024-05-21 PROCEDURE — ? SUNSCREEN RECOMMENDATIONS

## 2024-05-21 PROCEDURE — 11102 TANGNTL BX SKIN SINGLE LES: CPT | Mod: 59

## 2024-05-21 PROCEDURE — ? SEPARATE AND IDENTIFIABLE DOCUMENTATION

## 2024-05-21 PROCEDURE — 17110 DESTRUCTION B9 LES UP TO 14: CPT | Mod: 52

## 2024-05-21 ASSESSMENT — LOCATION SIMPLE DESCRIPTION DERM
LOCATION SIMPLE: POSTERIOR NECK
LOCATION SIMPLE: RIGHT BREAST
LOCATION SIMPLE: LEFT UPPER BACK
LOCATION SIMPLE: RIGHT UPPER BACK
LOCATION SIMPLE: LEFT CALF
LOCATION SIMPLE: RIGHT HAND
LOCATION SIMPLE: RIGHT CALF
LOCATION SIMPLE: RIGHT SCALP
LOCATION SIMPLE: ABDOMEN
LOCATION SIMPLE: RIGHT SHOULDER
LOCATION SIMPLE: LEFT CHEEK
LOCATION SIMPLE: CHEST

## 2024-05-21 ASSESSMENT — LOCATION ZONE DERM
LOCATION ZONE: LEG
LOCATION ZONE: SCALP
LOCATION ZONE: FACE
LOCATION ZONE: HAND
LOCATION ZONE: NECK
LOCATION ZONE: ARM
LOCATION ZONE: TRUNK

## 2024-05-21 ASSESSMENT — LOCATION DETAILED DESCRIPTION DERM
LOCATION DETAILED: LEFT RIB CAGE
LOCATION DETAILED: RIGHT MEDIAL SUPERIOR CHEST
LOCATION DETAILED: RIGHT MEDIAL FRONTAL SCALP
LOCATION DETAILED: RIGHT ULNAR DORSAL HAND
LOCATION DETAILED: LEFT POSTERIOR NECK
LOCATION DETAILED: RIGHT CENTRAL FRONTAL SCALP
LOCATION DETAILED: RIGHT DISTAL CALF
LOCATION DETAILED: RIGHT POSTERIOR SHOULDER
LOCATION DETAILED: RIGHT INFERIOR MEDIAL UPPER BACK
LOCATION DETAILED: LEFT INFERIOR LATERAL UPPER BACK
LOCATION DETAILED: LEFT MEDIAL MALAR CHEEK
LOCATION DETAILED: LEFT PROXIMAL CALF
LOCATION DETAILED: RIGHT INFRAMAMMARY CREASE (INNER QUADRANT)

## 2024-05-21 NOTE — PROCEDURE: LIQUID NITROGEN
Show Spray Paint Technique Variable?: Yes
Application Tool (Optional): Cry-AC
Spray Paint Text: The liquid nitrogen was applied to the skin utilizing a spray paint frosting technique.
Detail Level: Detailed
Medical Necessity Information: It is in your best interest to select a reason for this procedure from the list below. All of these items fulfill various CMS LCD requirements except the new and changing color options.
Medical Necessity Clause: This procedure was medically necessary because the lesions that were treated were:
Spray Paint Technique: No
Consent: The patient's verbal consent was obtained including but not limited to risks of crusting, scabbing, blistering, scarring, darker or lighter pigmentary change, recurrence, incomplete removal and infection.
Post-Care Instructions: I reviewed with the patient in detail post-care instructions. Patient is to wear sunprotection, and avoid picking at any of the treated lesions. Pt may apply Vaseline to crusted or scabbing areas.
Number Of Freeze-Thaw Cycles: 2 freeze-thaw cycles

## 2024-05-21 NOTE — PROCEDURE: SURGICAL DECISION MAKING
Discussion: We discussed not only the risks of the procedure but also the likely pozo that further treatment will be required to treat lesion. This could involve another visit here to destroy or excise  lesion, a visit to a Mohs or general or plastic surgeon, scarring, limited  activity, cosmetic imperfections.
Identified Risk Factors Documented?: yes
Risk Assessment Explanation (Does Not Render In The Note): Clinical determination of the probability and/or consequences of an event, such as surgery. Clinical assessment of the level of risk is affected by the nature of the event under consideration for the patient. Modifier 57 is used to indicate an Evaluation and Management (E/M) service resulted in the initial decision to perform surgery either the day before a major surgery (90 day global) or the day of a major surgery.
Date Of Surgery - Today Or Tomorrow?: today
Complexity (Necessary For Coding; Major - 90 Day Global With Some Exceptions; Minor - 10 Day Global): minor

## 2024-05-21 NOTE — PROCEDURE: OBSERVATION
Detail Level: Detailed
Size Of Lesion: 2 mm dark brown uniform macule
Size Of Lesion: 6 mm brown macule
Size Of Lesion: 1 cm tan patch

## 2024-05-21 NOTE — PROCEDURE: BIOPSY BY SHAVE METHOD
Detail Level: Detailed
Depth Of Biopsy: dermis
Was A Bandage Applied: Yes
Size Of Lesion In Cm: 0.4
X Size Of Lesion In Cm: 0
Biopsy Type: H and E
Biopsy Method: Dermablade
Anesthesia Type: 1% lidocaine without epinephrine
Hemostasis: Aluminum Chloride
Wound Care: Petrolatum
Dressing: pressure dressing
Destruction After The Procedure: No
Type Of Destruction Used: Curettage
Curettage Text: The wound bed was treated with curettage after the biopsy was performed.
Electrodesiccation And Curettage Text: The wound bed was treated with electrodesiccation and curettage after the biopsy was
Lab: [id number]
Lab Facility: 
Consent: Verbal consent was obtained and risks were reviewed including but not limited to scarring, infection, bleeding, scabbing, incomplete removal, nerve damage and allergy to anesthesia.
Post-Care Instructions: I reviewed with the patient in detail post-care instructions. Patient is to keep the biopsy site dry overnight, and then apply white petrolatum twice daily until healed. Patient may apply hydrogen peroxide soaks to remove any crusting.
Notification Instructions: Patient will be notified of biopsy results. However, patient instructed to call the office if not contacted within 2 weeks.
Billing Type: Third-Party Bill
Information: Selecting Yes will display possible errors in your note based on the variables you have selected. This validation is only offered as a suggestion for you. PLEASE NOTE THAT THE VALIDATION TEXT WILL BE REMOVED WHEN YOU FINALIZE YOUR NOTE. IF YOU WANT TO FAX A PRELIMINARY NOTE YOU WILL NEED TO TOGGLE THIS TO 'NO' IF YOU DO NOT WANT IT IN YOUR FAXED NOTE.

## 2024-05-21 NOTE — PROCEDURE: COUNSELING
Detail Level: Detailed
Detail Level: Generalized
Detail Level: Zone
Patient Specific Counseling (Will Not Stick From Patient To Patient): Recommend hydrocortisone OTC for itch
Detail Level: Simple

## 2025-06-30 ENCOUNTER — APPOINTMENT (OUTPATIENT)
Dept: URBAN - METROPOLITAN AREA CLINIC 35 | Facility: CLINIC | Age: 65
Setting detail: DERMATOLOGY
End: 2025-06-30

## 2025-06-30 DIAGNOSIS — L21.8 OTHER SEBORRHEIC DERMATITIS: ICD-10-CM

## 2025-06-30 DIAGNOSIS — L82.1 OTHER SEBORRHEIC KERATOSIS: ICD-10-CM

## 2025-06-30 DIAGNOSIS — D22 MELANOCYTIC NEVI: ICD-10-CM

## 2025-06-30 DIAGNOSIS — M71 OTHER BURSOPATHIES: ICD-10-CM

## 2025-06-30 DIAGNOSIS — Z71.89 OTHER SPECIFIED COUNSELING: ICD-10-CM

## 2025-06-30 DIAGNOSIS — L81.4 OTHER MELANIN HYPERPIGMENTATION: ICD-10-CM

## 2025-06-30 DIAGNOSIS — D18.0 HEMANGIOMA: ICD-10-CM

## 2025-06-30 DIAGNOSIS — Z85.828 PERSONAL HISTORY OF OTHER MALIGNANT NEOPLASM OF SKIN: ICD-10-CM

## 2025-06-30 PROBLEM — M71.342 OTHER BURSAL CYST, LEFT HAND: Status: ACTIVE | Noted: 2025-06-30

## 2025-06-30 PROBLEM — D18.01 HEMANGIOMA OF SKIN AND SUBCUTANEOUS TISSUE: Status: ACTIVE | Noted: 2025-06-30

## 2025-06-30 PROBLEM — D22.5 MELANOCYTIC NEVI OF TRUNK: Status: ACTIVE | Noted: 2025-06-30

## 2025-06-30 PROBLEM — D22.72 MELANOCYTIC NEVI OF LEFT LOWER LIMB, INCLUDING HIP: Status: ACTIVE | Noted: 2025-06-30

## 2025-06-30 PROCEDURE — ? COUNSELING

## 2025-06-30 PROCEDURE — ? ADDITIONAL NOTES

## 2025-06-30 PROCEDURE — ? SUNSCREEN RECOMMENDATIONS

## 2025-06-30 PROCEDURE — ? OBSERVATION

## 2025-06-30 ASSESSMENT — LOCATION DETAILED DESCRIPTION DERM
LOCATION DETAILED: LEFT RIB CAGE
LOCATION DETAILED: RIGHT INFERIOR MEDIAL UPPER BACK
LOCATION DETAILED: RIGHT MEDIAL SUPERIOR CHEST
LOCATION DETAILED: LEFT MEDIAL MALAR CHEEK
LOCATION DETAILED: RIGHT MEDIAL FRONTAL SCALP
LOCATION DETAILED: RIGHT ULNAR DORSAL HAND
LOCATION DETAILED: RIGHT CENTRAL FRONTAL SCALP
LOCATION DETAILED: LEFT PROXIMAL CALF
LOCATION DETAILED: RIGHT POSTERIOR SHOULDER
LOCATION DETAILED: LEFT INFERIOR LATERAL UPPER BACK
LOCATION DETAILED: RIGHT INFRAMAMMARY CREASE (INNER QUADRANT)
LOCATION DETAILED: LEFT DISTAL DORSAL INDEX FINGER

## 2025-06-30 ASSESSMENT — LOCATION SIMPLE DESCRIPTION DERM
LOCATION SIMPLE: LEFT INDEX FINGER
LOCATION SIMPLE: RIGHT SCALP
LOCATION SIMPLE: LEFT CHEEK
LOCATION SIMPLE: LEFT CALF
LOCATION SIMPLE: CHEST
LOCATION SIMPLE: RIGHT HAND
LOCATION SIMPLE: LEFT UPPER BACK
LOCATION SIMPLE: ABDOMEN
LOCATION SIMPLE: RIGHT UPPER BACK
LOCATION SIMPLE: RIGHT SHOULDER
LOCATION SIMPLE: RIGHT BREAST

## 2025-06-30 ASSESSMENT — LOCATION ZONE DERM
LOCATION ZONE: LEG
LOCATION ZONE: TRUNK
LOCATION ZONE: HAND
LOCATION ZONE: ARM
LOCATION ZONE: FACE
LOCATION ZONE: SCALP
LOCATION ZONE: FINGER

## 2025-06-30 NOTE — PROCEDURE: ADDITIONAL NOTES
Additional Notes: 6/30: Carly wants pt to protect her hands more as much as possible.
Detail Level: Detailed
Render Risk Assessment In Note?: yes

## 2025-06-30 NOTE — HPI: FULL BODY SKIN EXAMINATION
How Severe Are Your Spot(S)?: mild
What Type Of Note Output Would You Prefer (Optional)?: Standard Output
What Is The Reason For Today's Visit?: Full Body Skin Examination
What Is The Reason For Today's Visit? (Being Monitored For X): concerning skin lesions on a periodic basis
Additional History: Spot has been treated before, reoccurring spot. Spot is on hands and toes.
Principal Discharge DX:	Chest pain, unspecified type

## 2025-06-30 NOTE — PROCEDURE: COUNSELING
Detail Level: Detailed
Detail Level: Simple
Detail Level: Generalized
Detail Level: Zone
Patient Specific Counseling (Will Not Stick From Patient To Patient): Recommend hydrocortisone OTC for itch